# Patient Record
Sex: MALE | Race: WHITE | NOT HISPANIC OR LATINO | Employment: OTHER | URBAN - METROPOLITAN AREA
[De-identification: names, ages, dates, MRNs, and addresses within clinical notes are randomized per-mention and may not be internally consistent; named-entity substitution may affect disease eponyms.]

---

## 2017-02-23 ENCOUNTER — HOSPITAL ENCOUNTER (EMERGENCY)
Facility: HOSPITAL | Age: 82
Discharge: HOME | End: 2017-02-23
Attending: EMERGENCY MEDICINE | Admitting: EMERGENCY MEDICINE
Payer: MEDICARE

## 2017-02-23 VITALS
RESPIRATION RATE: 18 BRPM | OXYGEN SATURATION: 99 % | WEIGHT: 240 LBS | BODY MASS INDEX: 30.81 KG/M2 | SYSTOLIC BLOOD PRESSURE: 124 MMHG | HEART RATE: 65 BPM | TEMPERATURE: 97.6 F | DIASTOLIC BLOOD PRESSURE: 65 MMHG

## 2017-02-23 DIAGNOSIS — Z00.00 WELL ADULT EXAM: Primary | ICD-10-CM

## 2017-02-23 PROCEDURE — 99284 EMERGENCY DEPT VISIT MOD MDM: CPT

## 2017-02-23 PROCEDURE — 93005 ELECTROCARDIOGRAM TRACING: CPT | Performed by: EMERGENCY MEDICINE

## 2017-02-23 RX ORDER — LOPERAMIDE HYDROCHLORIDE 2 MG/1
2 CAPSULE ORAL AS NEEDED
COMMUNITY
End: 2017-12-11 | Stop reason: ALTCHOICE

## 2017-02-23 RX ORDER — FEBUXOSTAT 40 MG/1
40 TABLET, FILM COATED ORAL DAILY
COMMUNITY
End: 2017-11-18

## 2017-02-24 LAB
ATRIAL RATE: 300 BPM
QRS AXIS: -5 DEGREES
QRSD INTERVAL: 80 MS
QT INTERVAL: 400 MS
QTC INTERVAL: 412 MS
T WAVE AXIS: 31 DEGREES
VENTRICULAR RATE: 64 BPM

## 2017-10-05 ENCOUNTER — GENERIC CONVERSION - ENCOUNTER (OUTPATIENT)
Dept: OTHER | Facility: OTHER | Age: 82
End: 2017-10-05

## 2017-11-18 ENCOUNTER — APPOINTMENT (EMERGENCY)
Dept: RADIOLOGY | Facility: HOSPITAL | Age: 82
DRG: 682 | End: 2017-11-18
Payer: MEDICARE

## 2017-11-18 ENCOUNTER — HOSPITAL ENCOUNTER (INPATIENT)
Facility: HOSPITAL | Age: 82
LOS: 2 days | Discharge: HOME | DRG: 682 | End: 2017-11-21
Attending: EMERGENCY MEDICINE | Admitting: HOSPITALIST
Payer: MEDICARE

## 2017-11-18 ENCOUNTER — APPOINTMENT (EMERGENCY)
Dept: CT IMAGING | Facility: HOSPITAL | Age: 82
DRG: 682 | End: 2017-11-18
Payer: MEDICARE

## 2017-11-18 DIAGNOSIS — R41.0 ACUTE DELIRIUM: ICD-10-CM

## 2017-11-18 DIAGNOSIS — E86.9 VOLUME DEPLETION: ICD-10-CM

## 2017-11-18 DIAGNOSIS — N17.9 AKI (ACUTE KIDNEY INJURY) (HCC): Primary | ICD-10-CM

## 2017-11-18 DIAGNOSIS — E87.0 HYPERNATREMIA: ICD-10-CM

## 2017-11-18 PROBLEM — M10.9 GOUT: Status: ACTIVE | Noted: 2017-11-18

## 2017-11-18 LAB
ALBUMIN SERPL BCP-MCNC: 4 G/DL (ref 3.5–5)
ALP SERPL-CCNC: 67 U/L (ref 46–116)
ALT SERPL W P-5'-P-CCNC: 28 U/L (ref 12–78)
ANION GAP SERPL CALCULATED.3IONS-SCNC: 9 MMOL/L (ref 4–13)
APTT PPP: 30 SECONDS (ref 23–35)
AST SERPL W P-5'-P-CCNC: 21 U/L (ref 5–45)
BASOPHILS # BLD AUTO: 0.04 THOUSANDS/ΜL (ref 0–0.1)
BASOPHILS NFR BLD AUTO: 1 % (ref 0–1)
BILIRUB SERPL-MCNC: 0.75 MG/DL (ref 0.2–1)
BUN SERPL-MCNC: 61 MG/DL (ref 5–25)
CALCIUM SERPL-MCNC: 9.1 MG/DL (ref 8.3–10.1)
CHLORIDE SERPL-SCNC: 108 MMOL/L (ref 100–108)
CO2 SERPL-SCNC: 32 MMOL/L (ref 21–32)
CREAT SERPL-MCNC: 2.93 MG/DL (ref 0.6–1.3)
EOSINOPHIL # BLD AUTO: 0.11 THOUSAND/ΜL (ref 0–0.61)
EOSINOPHIL NFR BLD AUTO: 1 % (ref 0–6)
ERYTHROCYTE [DISTWIDTH] IN BLOOD BY AUTOMATED COUNT: 13.4 % (ref 11.6–15.1)
GFR SERPL CREATININE-BSD FRML MDRD: 17 ML/MIN/1.73SQ M
GLUCOSE SERPL-MCNC: 103 MG/DL (ref 65–140)
HCT VFR BLD AUTO: 35.1 % (ref 36.5–49.3)
HGB BLD-MCNC: 11.8 G/DL (ref 12–17)
INR PPP: 0.99 (ref 0.86–1.16)
LYMPHOCYTES # BLD AUTO: 1.88 THOUSANDS/ΜL (ref 0.6–4.47)
LYMPHOCYTES NFR BLD AUTO: 23 % (ref 14–44)
MCH RBC QN AUTO: 31.7 PG (ref 26.8–34.3)
MCHC RBC AUTO-ENTMCNC: 33.6 G/DL (ref 31.4–37.4)
MCV RBC AUTO: 94 FL (ref 82–98)
MONOCYTES # BLD AUTO: 0.63 THOUSAND/ΜL (ref 0.17–1.22)
MONOCYTES NFR BLD AUTO: 8 % (ref 4–12)
NEUTROPHILS # BLD AUTO: 5.47 THOUSANDS/ΜL (ref 1.85–7.62)
NEUTS SEG NFR BLD AUTO: 67 % (ref 43–75)
NRBC BLD AUTO-RTO: 0 /100 WBCS
PLATELET # BLD AUTO: 253 THOUSANDS/UL (ref 149–390)
PMV BLD AUTO: 10.8 FL (ref 8.9–12.7)
POTASSIUM SERPL-SCNC: 4.1 MMOL/L (ref 3.5–5.3)
PROT SERPL-MCNC: 7.4 G/DL (ref 6.4–8.2)
PROTHROMBIN TIME: 13.1 SECONDS (ref 12.1–14.4)
RBC # BLD AUTO: 3.72 MILLION/UL (ref 3.88–5.62)
SODIUM SERPL-SCNC: 149 MMOL/L (ref 136–145)
SPECIMEN SOURCE: NORMAL
TROPONIN I BLD-MCNC: 0.01 NG/ML (ref 0–0.08)
WBC # BLD AUTO: 8.13 THOUSAND/UL (ref 4.31–10.16)

## 2017-11-18 PROCEDURE — 80053 COMPREHEN METABOLIC PANEL: CPT | Performed by: EMERGENCY MEDICINE

## 2017-11-18 PROCEDURE — 36415 COLL VENOUS BLD VENIPUNCTURE: CPT | Performed by: EMERGENCY MEDICINE

## 2017-11-18 PROCEDURE — 84484 ASSAY OF TROPONIN QUANT: CPT

## 2017-11-18 PROCEDURE — 96360 HYDRATION IV INFUSION INIT: CPT

## 2017-11-18 PROCEDURE — 85730 THROMBOPLASTIN TIME PARTIAL: CPT | Performed by: EMERGENCY MEDICINE

## 2017-11-18 PROCEDURE — 99285 EMERGENCY DEPT VISIT HI MDM: CPT

## 2017-11-18 PROCEDURE — 85610 PROTHROMBIN TIME: CPT | Performed by: EMERGENCY MEDICINE

## 2017-11-18 PROCEDURE — 71010 HB CHEST X-RAY 1 VIEW FRONTAL (PORTABLE): CPT

## 2017-11-18 PROCEDURE — 93005 ELECTROCARDIOGRAM TRACING: CPT | Performed by: EMERGENCY MEDICINE

## 2017-11-18 PROCEDURE — 70450 CT HEAD/BRAIN W/O DYE: CPT

## 2017-11-18 PROCEDURE — 85025 COMPLETE CBC W/AUTO DIFF WBC: CPT | Performed by: EMERGENCY MEDICINE

## 2017-11-18 RX ORDER — LOPERAMIDE HYDROCHLORIDE 2 MG/1
2 CAPSULE ORAL AS NEEDED
Status: DISCONTINUED | OUTPATIENT
Start: 2017-11-18 | End: 2017-11-21 | Stop reason: HOSPADM

## 2017-11-18 RX ORDER — HYDRALAZINE HYDROCHLORIDE 20 MG/ML
10 INJECTION INTRAMUSCULAR; INTRAVENOUS EVERY 6 HOURS PRN
Status: DISCONTINUED | OUTPATIENT
Start: 2017-11-18 | End: 2017-11-21 | Stop reason: HOSPADM

## 2017-11-18 RX ORDER — FEBUXOSTAT 40 MG/1
TABLET, FILM COATED ORAL
COMMUNITY
Start: 2017-02-16

## 2017-11-18 RX ORDER — SIMVASTATIN 20 MG
TABLET ORAL
COMMUNITY
Start: 2013-01-21 | End: 2017-11-21 | Stop reason: HOSPADM

## 2017-11-18 RX ORDER — DONEPEZIL HYDROCHLORIDE 5 MG/1
10 TABLET, FILM COATED ORAL
Status: DISCONTINUED | OUTPATIENT
Start: 2017-11-18 | End: 2017-11-21 | Stop reason: HOSPADM

## 2017-11-18 RX ORDER — FEBUXOSTAT 40 MG/1
40 TABLET, FILM COATED ORAL DAILY
Status: DISCONTINUED | OUTPATIENT
Start: 2017-11-19 | End: 2017-11-21 | Stop reason: CLARIF

## 2017-11-18 RX ORDER — PRAVASTATIN SODIUM 40 MG
40 TABLET ORAL
Status: DISCONTINUED | OUTPATIENT
Start: 2017-11-19 | End: 2017-11-21 | Stop reason: HOSPADM

## 2017-11-18 RX ORDER — SODIUM CHLORIDE, SODIUM LACTATE, POTASSIUM CHLORIDE, CALCIUM CHLORIDE 600; 310; 30; 20 MG/100ML; MG/100ML; MG/100ML; MG/100ML
100 INJECTION, SOLUTION INTRAVENOUS CONTINUOUS
Status: DISCONTINUED | OUTPATIENT
Start: 2017-11-18 | End: 2017-11-19

## 2017-11-18 RX ORDER — CALCITRIOL 0.25 UG/1
0.25 CAPSULE, LIQUID FILLED ORAL EVERY OTHER DAY
Status: DISCONTINUED | OUTPATIENT
Start: 2017-11-19 | End: 2017-11-21 | Stop reason: HOSPADM

## 2017-11-18 RX ADMIN — DONEPEZIL HYDROCHLORIDE 10 MG: 5 TABLET, FILM COATED ORAL at 22:27

## 2017-11-18 RX ADMIN — SODIUM CHLORIDE 1000 ML: 0.9 INJECTION, SOLUTION INTRAVENOUS at 19:19

## 2017-11-18 RX ADMIN — SODIUM CHLORIDE, SODIUM LACTATE, POTASSIUM CHLORIDE, AND CALCIUM CHLORIDE 100 ML/HR: .6; .31; .03; .02 INJECTION, SOLUTION INTRAVENOUS at 22:27

## 2017-11-18 NOTE — ED PROVIDER NOTES
History  Chief Complaint   Patient presents with    Failure To Thrive     Per [place] patient last seen acting normal this am  Staff states that patient was not responding to them  Staff reports patient has  been refusing meds and food lately and has been noted with failure to thrive  Per EMS, sternal rub given and patient opened eyes and rolled his at them when they stated he was faking  81 yo male sent from [facility] dementia unit of [facility] [place] with concern that he was "unresponsive" which was described as not opening eyes or responding verbally to sternal rub  There was not report of apnea or pulselessness, and when paramedics arrived, he opened his eyes for them  NH staff then said he hasn't been eating and drinking well  On arrival in the ED, he has normal vital signs, moves all extremities, and will only answer yes/no if persistent questions are asked, although he did follow commands for me  History provided by:  Nursing home, EMS personnel and relative  History limited by:  Dementia      Prior to Admission Medications   Prescriptions Last Dose Informant Patient Reported? Taking? LEVOTHYROXINE SODIUM PO   Yes Yes   Sig: Take 0 125 mg by mouth daily  Memantine HCl-Donepezil HCl (NAMZARIC) 28-10 MG CP24   Yes Yes   Sig: Take 1 capsule by mouth   calcitriol (ROCALTROL) 0 25 mcg capsule   Yes Yes   Sig: Take 0 25 mcg by mouth every other day 3 times per week ( M/W/F)    febuxostat (ULORIC) 40 mg tablet   Yes Yes   Sig: Take by mouth   furosemide (LASIX) 20 mg tablet   Yes Yes   Sig: Take 20 mg by mouth daily  loperamide (IMODIUM) 2 mg capsule   Yes No   Sig: Take 2 mg by mouth as needed for diarrhea   losartan-hydrochlorothiazide (HYZAAR) 50-12 5 mg per tablet   Yes Yes   Sig: Take 1 tablet by mouth daily  simvastatin (ZOCOR) 20 mg tablet   Yes No   Sig: Take 20 mg by mouth daily at bedtime     simvastatin (ZOCOR) 20 mg tablet   Yes Yes   Sig: Take by mouth Facility-Administered Medications: None       Past Medical History:   Diagnosis Date    Ambulatory dysfunction     Arthritis     CKD (chronic kidney disease) stage 4, GFR 15-29 ml/min ([facility])     CVA (cerebral vascular accident) ([place] [address] )     Dementia     Disease of thyroid gland     Elevated blood uric acid level     GERD (gastroesophageal reflux disease)     GI bleed     HTN (hypertension), benign     Hyperlipidemia     Hyperlipidemia     Hypertension     Hypothyroid     Normocytic anemia     [facility] disease) ([facility])     Pulmonary embolism (HCC)     Renal disorder     Secondary hyperparathyroidism ([place] [address] )     Stroke ([place] [address] )     Vitamin D deficiency        Past Surgical History:   Procedure Laterality Date    NO PAST SURGERIES         Family History   Problem Relation Age of Onset    Diabetes Mother     Cancer Father      I have reviewed and agree with the history as documented  Social History   Substance Use Topics    Smoking status: Never Smoker    Smokeless tobacco: Not on file    Alcohol use Yes      Comment: socially         Review of Systems   Unable to perform ROS: Dementia       Physical Exam  ED Triage Vitals   Temperature Pulse Respirations Blood Pressure SpO2   11/18/17 1839 11/18/17 1843 11/18/17 1843 11/18/17 1843 11/18/17 1843   (!) 97 4 °F (36 3 °C) (!) 53 16 160/73 94 %      Temp Source Heart Rate Source Patient Position - Orthostatic VS BP Location FiO2 (%)   11/18/17 1839 11/18/17 1843 11/18/17 1843 11/18/17 1843 --   Oral Monitor Sitting Left arm       Pain Score       --                  Orthostatic Vital Signs  Vitals:    11/18/17 1843   BP: 160/73   Pulse: (!) 53   Patient Position - Orthostatic VS: Sitting       Physical Exam   Constitutional: He appears well-developed and well-nourished  HENT:   Head: Normocephalic and atraumatic     Right Ear: External ear normal    Left Ear: External ear normal    Nose: Nose normal    Mouth/Throat: Oropharynx is clear and moist  Mucous membranes are dry  Eyes: Conjunctivae and EOM are normal  Pupils are equal, round, and reactive to light  Neck: Normal range of motion  Neck supple  Cardiovascular: Normal heart sounds  Bradycardia present  Exam reveals no decreased pulses  Pulmonary/Chest: Effort normal    Abdominal: There is no tenderness  Musculoskeletal: Normal range of motion  Neurological:   He prefers to keep his eyes closed and not talk to anyone, but is awake, and will follow commands that are simple if we are persistent  Skin: Skin is warm and dry  Nursing note and vitals reviewed  ED Medications  Medications   sodium chloride 0 9 % bolus 1,000 mL (1,000 mL Intravenous New Bag 11/18/17 1919)       Diagnostic Studies  Results Reviewed     Procedure Component Value Units Date/Time    Comprehensive metabolic panel [[id number]]  (Abnormal) Collected:  11/18/17 1847    Lab Status:  Final result Specimen:  Blood from Arm, Right Updated:  11/18/17 1908     Sodium 149 (H) mmol/L      Potassium 4 1 mmol/L      Chloride 108 mmol/L      CO2 32 mmol/L      Anion Gap 9 mmol/L      BUN 61 (H) mg/dL      Creatinine 2 93 (H) mg/dL      Glucose 103 mg/dL      Calcium 9 1 mg/dL      AST 21 U/L      ALT 28 U/L      Alkaline Phosphatase 67 U/L      Total Protein 7 4 g/dL      Albumin 4 0 g/dL      Total Bilirubin 0 75 mg/dL      eGFR 17 ml/min/1 73sq m     Narrative:         National Kidney Disease Education Program recommendations are as follows:  GFR calculation is accurate only with a steady state creatinine  Chronic Kidney disease less than 60 ml/min/1 73 sq  meters  Kidney failure less than 15 ml/min/1 73 sq  meters      Protime-INR [[id number]]  (Normal) Collected:  11/18/17 1847    Lab Status:  Final result Specimen:  Blood from Arm, Right Updated:  11/18/17 1904     Protime 13 1 seconds      INR 0 99    APTT [[id number]]  (Normal) Collected:  11/18/17 1847    Lab Status:  Final result Specimen:  Blood from Arm, Right Updated:  11/18/17 1904     PTT 30 seconds     Narrative: Therapeutic Heparin Range = 60-90 seconds    POCT troponin [[id number]]  (Normal) Collected:  11/18/17 1843    Lab Status:  Final result Updated:  11/18/17 1856     POC Troponin I 0 01 ng/ml      Specimen Type VENOUS    Narrative:         [employer] i-Stat handheld analyzer 99% cutoff is > 0 08ng/mL in [facility] Emergency Departments    o cTnI 99% cutoff is useful only when applied to patients in the clinical setting of myocardial ischemia  o cTnI 99% cutoff should be interpreted in the context of clinical history, ECG findings and possibly cardiac imaging to establish correct diagnosis  o cTnI 99% cutoff may be suggestive but clearly not indicative of a coronary event without the clinical setting of myocardial ischemia  CBC and differential [[id number]]  (Abnormal) Collected:  11/18/17 1847    Lab Status:  Final result Specimen:  Blood from Arm, Right Updated:  11/18/17 1856     WBC 8 13 Thousand/uL      RBC 3 72 (L) Million/uL      Hemoglobin 11 8 (L) g/dL      Hematocrit 35 1 (L) %      MCV 94 fL      MCH 31 7 pg      MCHC 33 6 g/dL      RDW 13 4 %      MPV 10 8 fL      Platelets 087 Thousands/uL      nRBC 0 /100 WBCs      Neutrophils Relative 67 %      Lymphocytes Relative 23 %      Monocytes Relative 8 %      Eosinophils Relative 1 %      Basophils Relative 1 %      Neutrophils Absolute 5 47 Thousands/µL      Lymphocytes Absolute 1 88 Thousands/µL      Monocytes Absolute 0 63 Thousand/µL      Eosinophils Absolute 0 11 Thousand/µL      Basophils Absolute 0 04 Thousands/µL     POCT urinalysis dipstick [[id number]]     Lab Status:  No result Specimen:  Urine                  XR chest 1 view portable   ED Interpretation by Sage Faulkner MD (11/18 1938)   No acute findings      CT head without contrast   Final Result by Marlene Blackman MD (11/18 1923)      No acute intracranial abnormality  Microangiopathic changes           Workstation performed: [id number]                    Procedures  ECG 12 Lead Documentation  Date/Time: 11/18/2017 6:40 PM  Performed by: Ron Sorto by: Edith Carmona     Rate:     ECG rate:  51  Rhythm:     Rhythm: sinus rhythm and A-V block    Conduction:     Conduction: abnormal      Abnormal conduction: 1st degree    ST segments:     ST segments:  Non-specific  CriticalCare Time  Performed by: Edith Carmona  Authorized by: Maria M Chen, [facility], [facility] provider statement:     Critical care time (minutes):  30    Critical care time was exclusive of:  Separately billable procedures and treating other patients and teaching time    Critical care was necessary to treat or prevent imminent or life-threatening deterioration of the following conditions:  Metabolic crisis    Critical care was time spent personally by me on the following activities:  Blood draw for specimens, obtaining history from patient or surrogate, development of treatment plan with patient or surrogate, discussions with consultants, evaluation of patient's response to treatment, examination of patient, interpretation of cardiac output measurements, ordering and performing treatments and interventions, ordering and review of laboratory studies, ordering and review of radiographic studies, re-evaluation of patient's condition and review of old charts    I assumed direction of critical care for this patient from another provider in my specialty: no             Phone Contacts  ED Phone Contact    ED Course  ED Course as of Nov 18 1953   Sat Nov 18, 2017   1854 D/W his daughter Sri Peralta at bedside, who says his baseline is actually more on the agitated side, ambulatory, at times belligerent and has been known to escape, and for that matter, he gets agitated and thinks he is leaving when she visits him on the unit  She says that he is definitely much less active than his baseline    She confirmed he is "definitely no code", but IV fluids, antibiotics and other interventions are acceptable  They have no intention of pursuing aggressive medical treatment for medical conditions that represent worsening of baseline conditions or likely to cause imminent death  She is already prepared to allow him to go back to the NH after ED testing if there is not an immediately reversible condition from which he would benefit from hospitalization  1936 Hypernatremia c/w volume depletion Sodium: (!) 149   1937 Baseline kidney disease, CRT elevated from previous baseline, also c/w acute volume depletion  I d/w his daughter and the decision was made for admission for rehydration, correction of hypernatremia and CRT until it is clear he is either at a new baseline or improving     Creatinine: (!) 2 93   1944 BUN: (!) 61                               MDM  CritCare Time    Disposition  Final diagnoses:   KULWINDER (acute kidney injury) ([facility] [address] )   Hypernatremia   Volume depletion   Acute delirium     Time reflects when diagnosis was documented in both MDM as applicable and the Disposition within this note     Time User Action Codes Description Comment    11/18/2017  7:49 PM Ledy Paganini L Add [N17 9] Acute kidney injury ([facility] [address] )     11/18/2017  7:49 PM Ortiz Joshua [N17 9] Acute kidney injury ([facility] [address] )     11/18/2017  7:49 PM Ledy Paganini L Add [N17 9] KULWINDER (acute kidney injury) ([facility] [address] )     11/18/2017  7:49 PM Ledy Paganini L Add [E87 0] Hypernatremia     11/18/2017  7:49 PM Robyn Spinner Add [E86 9] Volume depletion     11/18/2017  7:49 PM Ledy Paganini L Add [R41 0] Acute delirium       ED Disposition     ED Disposition Condition Comment    Admit  Case was discussed with [place], for SLIM and the patient's admission status was agreed to be Admission Status: observation status to the service of Dr Brittany Santiago          Follow-up Information    None       Patient's Medications   Discharge Prescriptions    No medications on file     No discharge procedures on file     ED Provider  Electronically Signed by           Jose Franklin MD  11/18/17 5174

## 2017-11-18 NOTE — ED NOTES
Daughter reports adjustment in med recently and not sure if this is cause of patients behavior     Jade Zambrano RN  11/18/17 5822

## 2017-11-19 LAB
ANION GAP SERPL CALCULATED.3IONS-SCNC: 9 MMOL/L (ref 4–13)
BACTERIA UR QL AUTO: ABNORMAL /HPF
BILIRUB UR QL STRIP: NEGATIVE
BUN SERPL-MCNC: 58 MG/DL (ref 5–25)
CALCIUM SERPL-MCNC: 8.6 MG/DL (ref 8.3–10.1)
CHLORIDE SERPL-SCNC: 111 MMOL/L (ref 100–108)
CLARITY UR: CLEAR
CO2 SERPL-SCNC: 28 MMOL/L (ref 21–32)
COLOR UR: YELLOW
CREAT SERPL-MCNC: 2.45 MG/DL (ref 0.6–1.3)
GFR SERPL CREATININE-BSD FRML MDRD: 22 ML/MIN/1.73SQ M
GLUCOSE P FAST SERPL-MCNC: 93 MG/DL (ref 65–99)
GLUCOSE SERPL-MCNC: 93 MG/DL (ref 65–140)
GLUCOSE UR STRIP-MCNC: NEGATIVE MG/DL
HGB UR QL STRIP.AUTO: ABNORMAL
KETONES UR STRIP-MCNC: NEGATIVE MG/DL
LEUKOCYTE ESTERASE UR QL STRIP: NEGATIVE
NITRITE UR QL STRIP: NEGATIVE
NON-SQ EPI CELLS URNS QL MICRO: ABNORMAL /HPF
PH UR STRIP.AUTO: 6 [PH] (ref 4.5–8)
POTASSIUM SERPL-SCNC: 3.6 MMOL/L (ref 3.5–5.3)
PROT UR STRIP-MCNC: NEGATIVE MG/DL
RBC #/AREA URNS AUTO: ABNORMAL /HPF
SODIUM SERPL-SCNC: 148 MMOL/L (ref 136–145)
SP GR UR STRIP.AUTO: 1.02 (ref 1–1.03)
UROBILINOGEN UR QL STRIP.AUTO: >=8 E.U./DL
WBC #/AREA URNS AUTO: ABNORMAL /HPF

## 2017-11-19 PROCEDURE — 81001 URINALYSIS AUTO W/SCOPE: CPT | Performed by: HOSPITALIST

## 2017-11-19 PROCEDURE — 80048 BASIC METABOLIC PNL TOTAL CA: CPT | Performed by: PHYSICIAN ASSISTANT

## 2017-11-19 RX ORDER — NYSTATIN 100000 [USP'U]/G
POWDER TOPICAL 2 TIMES DAILY
Status: DISCONTINUED | OUTPATIENT
Start: 2017-11-19 | End: 2017-11-21 | Stop reason: HOSPADM

## 2017-11-19 RX ORDER — SODIUM CHLORIDE 450 MG/100ML
50 INJECTION, SOLUTION INTRAVENOUS CONTINUOUS
Status: DISCONTINUED | OUTPATIENT
Start: 2017-11-19 | End: 2017-11-21 | Stop reason: HOSPADM

## 2017-11-19 RX ADMIN — LEVOTHYROXINE SODIUM 125 MCG: 100 TABLET ORAL at 09:12

## 2017-11-19 RX ADMIN — SODIUM CHLORIDE 50 ML/HR: 0.45 INJECTION, SOLUTION INTRAVENOUS at 15:29

## 2017-11-19 RX ADMIN — NYSTATIN: 100000 POWDER TOPICAL at 17:45

## 2017-11-19 RX ADMIN — PRAVASTATIN SODIUM 40 MG: 40 TABLET ORAL at 17:00

## 2017-11-19 RX ADMIN — CALCITRIOL 0.25 MCG: 0.25 CAPSULE ORAL at 09:12

## 2017-11-19 RX ADMIN — DONEPEZIL HYDROCHLORIDE 10 MG: 5 TABLET, FILM COATED ORAL at 21:29

## 2017-11-19 NOTE — PHYSICIAN ADVISOR
Current patient class: Inpatient  The patient is currently on Hospital Day: 2      The patient was admitted to the hospital at [phone] on 11/19/17 for the following diagnosis:  Acute delirium [R41 0]  Hypernatremia [E87 0]  Volume depletion [E86 9]  Unresponsive [R41 89]  KULWINDER (acute kidney injury) ([facility] [address] ) [N17 9]       There is documentation in the medical record of an expected length of stay of at least 2 midnights  The patient is therefore expected to satisfy the 2 midnight benchmark and given the 2 midnight presumption is appropriate for INPATIENT ADMISSION  Given this expectation of a satisfying stay, CMS instructs us that the patient is most often appropriate for inpatient admission under part A provided medical necessity is documented in the chart  After review of the relevant documentation, labs, vital signs and test results, the patient is appropriate for INPATIENT ADMISSION  Admission to the hospital as an inpatient is a complex decision making process which requires the practitioner to consider the patients presenting complaint, history and physical examination and all relevant testing  With this in mind, in this case, the patient was deemed appropriate for INPATIENT ADMISSION  After review of the documentation and testing available at the time of the admission I concur with this clinical determination of medical necessity  Rationale is as follows: The patient is a 80 yrs old Male who presented to the ED at 11/18/2017  6:34 PM with a chief complaint of Failure To Thrive (Per [place] patient last seen acting normal this am  Staff states that patient was not responding to them  Staff reports patient has  been refusing meds and food lately and has been noted with failure to thrive    Per EMS, sternal rub given and patient opened eyes and rolled his at them when they stated he was faking  )    The patients vitals on arrival were ED Triage Vitals   Temperature Pulse Respirations Blood Pressure SpO2   11/18/17 1839 11/18/17 1843 11/18/17 1843 11/18/17 1843 11/18/17 1843   (!) 97 4 °F (36 3 °C) (!) 53 16 160/73 94 %      Temp Source Heart Rate Source Patient Position - Orthostatic VS BP Location FiO2 (%)   11/18/17 1839 11/18/17 1843 11/18/17 1843 11/18/17 1843 --   Oral Monitor Sitting Left arm       Pain Score       11/18/17 2204       No Pain           Past Medical History:   Diagnosis Date    KULWINDER [facility] kidney injury) ([facility] [address] ) 11/18/2017    Ambulatory dysfunction     Arthritis     CKD (chronic kidney disease) stage 4, GFR 15-29 ml/min ([facility])     CVA (cerebral vascular accident) ([facility] [address] )     Dementia     Disease of thyroid gland     Elevated blood uric acid level     GERD (gastroesophageal reflux disease)     GI bleed     Gout 11/18/2017    HTN (hypertension), benign     Hyperlipidemia     Hyperlipidemia     Hypertension     Hypothyroid     Normocytic anemia     PAD (peripheral artery disease) (HCC)     Pulmonary embolism (HCC)     Renal disorder     Secondary hyperparathyroidism ([facility] [address] )     [facility]     Vitamin D deficiency      Past Surgical History:   Procedure Laterality Date    NO PAST SURGERIES             Consults have been placed to:   None    Vitals:    11/18/17 2204 11/18/17 2300 11/19/17 0813 11/19/17 1537   BP: 154/66 153/67 128/62 123/61   Pulse:  64 58 (!) 53   Resp:  16 18 18   Temp:  98 7 °F (37 1 °C) 100 1 °F (37 8 °C) 98 °F (36 7 °C)   TempSrc:  Oral Temporal Temporal   SpO2:  99% 97% 98%   Weight:  98 4 kg (216 lb 14 9 oz)     Height:  5' 10" (1 778 m)         Most recent labs:    Recent Labs      11/18/17   1847  11/19/17   0456   WBC  8 13   --    HGB  11 8*   --    HCT  35 1*   --    PLT  253   --    K  4 1  3 6   NA  149*  148*   CALCIUM  9 1  8 6   BUN  61*  58*   CREATININE  2 93*  2 45*   INR  0 99   --    AST  21   --    ALT  28   --    ALKPHOS  67   --    BILITOT  0 75   --        Scheduled Meds:  calcitriol 0 25 mcg Oral Every Other Day   donepezil 10 mg Oral HS   febuxostat 40 mg Oral Daily   levothyroxine 125 mcg Oral Daily   nystatin  Topical BID   pravastatin 40 mg Oral Daily With Dinner     Continuous Infusions:  sodium chloride 50 mL/hr Last Rate: 50 mL/hr (11/19/17 1529)     PRN Meds: hydrALAZINE    loperamide    Surgical procedures (if appropriate):

## 2017-11-19 NOTE — PROGRESS NOTES
To Obtain urine sample ordered straight cath had to be performed  Dasha Moon RN was cath keyla  Obtained clear yellow urine with no issues

## 2017-11-19 NOTE — H&P
History and Physical - [facility]    Patient Information: Jonah Munguia 80 y o  male MRN: [id number]  Unit/Bed#: ED 20 Encounter: [id number]  Admitting Physician: Tiff Wilson PA-C  PCP: Yahir Ingram MD  Date of Admission:  11/18/17      Assessment:    KULWINDER (acute kidney injury) ([place] [address] )    Plan:    KULWINDER (acute kidney injury) ([place] [address] )  · Underlying chronic kidney disease with creatinine of 2 93 consistent with significant elevation over previous  · Will hold hydrochlorothiazide and provide IV hydration and recheck BMP in the morning    Hypernatremia  · Sodium today 149  Will recheck in the morning following hydration    Hypertension  · Hold Lasix  · Switch to hydralazine IV to avoid Hyzaar    Gout  · Continue Uloric    Dementia  · Patient resides in a locked dementia unit at [facility]    Hyperlipidemia  · Statin      HPI:   Denisse Bentley is a 80 y o  male who resides in a locked dementia unit at [facility] who was reportedly unresponsive by the staff  His baseline is somewhat agitated and cantankerous  He was nonresponsive to sternal rub by the staff however he did respond to sternal rub by EMS  He was not pulseless or apneic at any point  This listless was different than his normal behavior  In the emergency department he was found to have an elevated creatinine with a hypernatremia  Denies: Chest pain, Shortness of Breath, Nausea, Vomiting, Diarrhea, Dysuria at present however patient is demented and thorough review of systems cannot be obtained    ROS:  Please see the HPI for the full details       PMH:  Principal Problem:    KULWINDER (acute kidney injury) ([place] [address] )  Active Problems:    CKD (chronic kidney disease) stage 4, GFR 15-29 ml/min ([facility])    Normocytic anemia    Hypothyroid    HTN (hypertension), benign    Dementia    Ambulatory dysfunction    Hyperlipidemia    Gout      Past Medical History:   Diagnosis Date    KULWINDER (acute kidney injury) ([place] [address] ) 11/18/2017    Ambulatory dysfunction     Arthritis     CKD (chronic kidney disease) stage 4, GFR 15-29 ml/min ([facility])     CVA [facility]cerebral vascular accident) [facility] )     Dementia     Disease of thyroid gland     Elevated blood uric acid level     GERD (gastroesophageal reflux disease)     GI bleed     Gout 11/18/2017    HTN (hypertension), benign     Hyperlipidemia     Hyperlipidemia     Hypertension     Hypothyroid     Normocytic anemia     PAD (peripheral artery disease) ([facility])     Pulmonary embolism (HCC)     Renal disorder     [facility]parathyroidism ([facility] )     Stroke ([facility] )     Vitamin D deficiency      Past Surgical History:   Procedure Laterality Date    NO PAST SURGERIES       Social History     Social History    Marital status:      Spouse name: N/A    Number of children: N/A    Years of education: N/A     Social History Main Topics    Smoking status: Never Smoker    Smokeless tobacco: Never Used    Alcohol use Yes      Comment: socially     Drug use: No    Sexual activity: Not Currently     Other Topics Concern    None     Social History Narrative    None     Family History   Problem Relation Age of Onset    Diabetes Mother     Cancer Father        MED/ALLERGIES:  No current facility-administered medications for this encounter  Current Outpatient Prescriptions   Medication Sig Dispense Refill    calcitriol (ROCALTROL) 0 25 mcg capsule Take 0 25 mcg by mouth every other day 3 times per week ( M/W/F)       febuxostat (ULORIC) 40 mg tablet Take by mouth      furosemide (LASIX) 20 mg tablet Take 20 mg by mouth daily   LEVOTHYROXINE SODIUM PO Take 0 125 mg by mouth daily   losartan-hydrochlorothiazide (HYZAAR) 50-12 5 mg per tablet Take 1 tablet by mouth daily        Memantine HCl-Donepezil HCl (NAMZARIC) 28-10 MG CP24 Take 1 capsule by mouth      simvastatin (ZOCOR) 20 mg tablet Take by mouth      loperamide (IMODIUM) 2 mg capsule Take 2 mg by mouth as needed for diarrhea      simvastatin (ZOCOR) 20 mg tablet Take 20 mg by mouth daily at bedtime  Allergies   Allergen Reactions    Allopurinol     Aspirin GI Bleeding       OBJECTIVE:    Current Vitals:   Blood Pressure: 155/70 (11/18/17 1945)  Pulse: (!) 52 (11/18/17 2000)  Temperature: (!) 97 4 °F (36 3 °C) (11/18/17 1839)  Temp Source: Oral (11/18/17 1839)  Respirations: 16 (11/18/17 2000)  Weight - Scale: 104 kg (229 lb 4 5 oz) (11/18/17 1840)  SpO2: 96 % (11/18/17 2000)    No intake or output data in the 24 hours ending 11/18/17 2039    Invasive Devices     Peripheral Intravenous Line            Peripheral IV 11/18/17 Right Antecubital less than 1 day                  Physical Exam   Constitutional: He appears well-developed and well-nourished  No distress  HENT:   Head: Normocephalic and atraumatic  Right Ear: External ear normal    Left Ear: External ear normal    Eyes: EOM are normal  Pupils are equal, round, and reactive to light  Right eye exhibits no discharge  Left eye exhibits no discharge  No scleral icterus  Neck: No thyromegaly present  Cardiovascular: Normal rate and normal heart sounds  An irregular rhythm present  No murmur heard  Pulses:       Radial pulses are 2+ on the right side, and 2+ on the left side  Pulmonary/Chest: Effort normal and breath sounds normal    Abdominal: Soft  Bowel sounds are normal  He exhibits no distension  There is no tenderness  Musculoskeletal: He exhibits no edema or tenderness  Lymphadenopathy:     He has no cervical adenopathy  Neurological: He is disoriented  Skin: He is not diaphoretic                                                       Lab Results:   Results from last 7 days  Lab Units 11/18/17  1847   WBC Thousand/uL 8 13   HEMOGLOBIN g/dL 11 8*   HEMATOCRIT % 35 1*   PLATELETS Thousands/uL 253      Results from last 7 days  Lab Units 11/18/17 1847   SODIUM mmol/L 149*   POTASSIUM mmol/L 4 1   CHLORIDE mmol/L 108   CO2 mmol/L 32   BUN mg/dL 61* CREATININE mg/dL 2 93*   CALCIUM mg/dL 9 1   TOTAL PROTEIN g/dL 7 4   BILIRUBIN TOTAL mg/dL 0 75   ALK PHOS U/L 67   ALT U/L 28   AST U/L 21   GLUCOSE RANDOM mg/dL 103     Lab Results   Component Value Date    CKTOTAL 151 06/26/2016    TROPONINI <0 02 06/26/2016         Imaging:  CT BRAIN - WITHOUT CONTRAST     INDICATION:  Altered mental status     COMPARISON:  None      TECHNIQUE:  CT examination of the brain was performed  In addition to axial images, coronal reformatted images were created and submitted for interpretation        Radiation dose length product (DLP) for this visit:  1114 mGy-cm   This examination, like all CT scans performed in the [facility], was performed utilizing techniques to minimize radiation dose exposure, including the use of iterative   reconstruction and automated exposure control        IMAGE QUALITY:  Diagnostic      FINDINGS:     PARENCHYMA:  Decreased attenuation is noted in the supratentorial white matter demonstrating an appearance most consistent with mild microangiopathic change      No intracranial mass, mass effect or midline shift  No CT signs of acute infarction  There is no parenchymal hemorrhage           VENTRICLES AND EXTRA-AXIAL SPACES:  Enlargement of ventricles and extra-axial CSF spaces consistent with cerebral and cerebellar atrophy      VISUALIZED ORBITS AND PARANASAL SINUSES:  Unremarkable      CALVARIUM AND EXTRACRANIAL SOFT TISSUES:   Normal      IMPRESSION:     No acute intracranial abnormality  Microangiopathic changes  CXR: No Acute Pulmonary disease    EKG, Pathology, and Other Studies: EKG: No acute ischemic findings; Sinus w/ 1st degree A-V block    VTE Prophylaxis: Reason for no pharmacologic prophylaxis Obs status    Code Status: DNR/DNI    Counseling / Coordination of Care: Total floor / unit time spent today 20 minutes      Anticipated Length of Stay will be: LESS THAN 2 (TWO) Midnights     Milagros Ko PA-C    This note has been constructed using a voice recognition system

## 2017-11-19 NOTE — CASE MANAGEMENT
Initial Clinical Review    Admission: Date/Time/Statement:    OBSERVATION 11/18/17@ 1952    INPATIENT ADMISSION 11/19/17 @ 0819    Orders Placed This Encounter   Procedures    Place in Observation (expected length of stay for this patient is less than two midnights)     Standing Status:   Standing     Number of Occurrences:   1     Order Specific Question:   Admitting Physician     Answer:   Fransico Bateman [[id number]]     Order Specific Question:   Level of Care     Answer:   Med Surg [[id number]]     11/19/17 0819 Release Arlyne Schaumann, MD (auto-released) Anaya Escobar   11/19/17 0819 Complete Arlyne Schaumann, MD    Order Details     Frequency Duration Priority Order Class   Once 1  occurrence Routine Hospital Performed   Order Questions     Question Answer Comment   Admitting Physician Fransico Bateman    Level of Care Med Surg    Estimated length of stay More than 2 Midnights    Certification I certify that inpatient services are medically necessary for this patient for a duration of greater than two midnights  See H&P and MD Progress Notes for additional information about the patient's course of treatment  ED: Date/Time/Mode of Arrival:   ED Arrival Information     Expected Arrival Acuity Means of Arrival Escorted By Service Admission Type    - 11/18/2017 18:34 Urgent Ambulance [address] [facility] Urgent    Arrival Complaint    Unresponsive      Chief Complaint:   Chief Complaint   Patient presents with    Failure To Thrive     Per [facility] patient last seen acting normal this am  Staff states that patient was not responding to them  Staff reports patient has  been refusing meds and food lately and has been noted with failure to thrive  Per EMS, sternal rub given and patient opened eyes and rolled his at them when they stated he was faking      History of Illness:   79 yo male sent from locked dementia unit of [facility] with concern that he was "unresponsive" which was described as not opening eyes or responding verbally to sternal rub  There was not report of apnea or pulselessness, and when paramedics arrived, he opened his eyes for them  NH staff then said he hasn't been eating and drinking well  On arrival in the ED, he has normal vital signs, moves all extremities, and will only answer yes/no if persistent questions are asked, although he did follow commands for me  ED Vital Signs:   ED Triage Vitals   Temperature Pulse Respirations Blood Pressure SpO2   11/18/17 1839 11/18/17 1843 11/18/17 1843 11/18/17 1843 11/18/17 1843   (!) 97 4 °F (36 3 °C) (!) 53 16 160/73 94 %      Temp Source Heart Rate Source Patient Position - Orthostatic VS BP Location FiO2 (%)   11/18/17 1839 11/18/17 1843 11/18/17 1843 11/18/17 1843 --   Oral Monitor Sitting Left arm       Pain Score       11/18/17 2204       No Pain        Wt Readings from Last 1 Encounters:   11/18/17 98 4 kg (216 lb 14 9 oz)   Vital Signs (abnormal): Abnormal Labs/Diagnostic Test Results:     BUN 58 CR 2 45 GFR 22  CT HEAD=No acute intracranial abnormality  Microangiopathic changes  ED Treatment:   Medication Administration from 11/18/2017 1834 to 11/18/2017 2154       Date/Time Order Dose Route Action Action by Comments     11/18/2017 2059 sodium chloride 0 9 % bolus 1,000 mL 0 mL Intravenous Stopped Byron Brannon RN      11/18/2017 1919 sodium chloride 0 9 % bolus 1,000 mL 1,000 mL Intravenous New Bag Byron Brannon RN       Past Medical/Surgical History:    Active Ambulatory Problems     Diagnosis Date Noted    Vitamin D deficiency     [facility]     [facility]     GERD (gastroesophageal reflux disease)     CKD (chronic kidney disease) stage 4, GFR 15-29 ml/min ([facility]     Elevated blood uric acid level     Normocytic anemia     Hypothyroid     HTN (hypertension), benign     Dementia     Ambulatory dysfunction     Hyperlipidemia     Cellulitis of left hand 06/26/2016     Resolved Ambulatory Problems     Diagnosis Date Noted    No Resolved Ambulatory Problems     Past Medical History:   Diagnosis Date    KULWINDER (acute kidney injury) ([place] [address] ) 11/18/2017    Ambulatory dysfunction     Arthritis     CKD (chronic kidney disease) stage 4, GFR 15-29 ml/min ([facility])     CVA (cerebral vascular accident) ([place] [address] )     Dementia     Disease of thyroid gland     Elevated blood uric acid level     GERD (gastroesophageal reflux disease)     GI bleed     Gout 11/18/2017    HTN (hypertension), benign     Hyperlipidemia     Hyperlipidemia     Hypertension     Hypothyroid     Normocytic anemia     [facility]AD [facility]peripheral [facility] disease) ([facility])     Pulmonary embolism (HCC)     Renal disorder     Secondary hyperparathyroidism ([place] [address] )     Stroke ([place] [address] )     Vitamin D deficiency    Admitting Diagnosis: Acute delirium [R41 0]  Hypernatremia [E87 0]  Volume depletion [E86 9]  Unresponsive [R41 89]  KULWINDER (acute kidney injury) ([place] [address] ) [N17 9]  Age/Sex: 80 y o  male  Assessment/Plan:   KULWINDER (acute kidney injury) ([place] [address] )  · Underlying chronic kidney disease with creatinine of 2 93 consistent with significant elevation over previous  · Will hold hydrochlorothiazide and provide IV hydration and recheck BMP in the morning  Hypernatremia  · Sodium today 149    Will recheck in the morning following hydration  Hypertension  · Hold Lasix  · Switch to hydralazine IV to avoid Hyzaar  Gout  · Continue Uloric  Dementia  · Patient resides in a locked dementia unit at [facility]  Hyperlipidemia  · Statin  Admission Orders:  MED SURG  BLE VENODYNES  Scheduled Meds:   calcitriol 0 25 mcg Oral Every Other Day   donepezil 10 mg Oral HS   febuxostat 40 mg Oral Daily   levothyroxine 125 mcg Oral Daily   pravastatin 40 mg Oral Daily With Dinner     Continuous Infusions:   lactated ringers 100 mL/hr Last Rate: 100 mL/hr (11/18/17 2227)     PRN Meds: hydrALAZINE    loperamide

## 2017-11-19 NOTE — PROGRESS NOTES
Progress Note Keron Patel 80 y o  male MRN: [id number]    Unit/Bed#: E4 -01 Encounter: [id number]      Assessment/Plan:  1-acute renal failure on chronic kidney disease 3  Likely secondary to poor oral intake  Patient's baseline creatinine is around 1 74  He was found to have a creatinine of 2 93--which has now trended down to 2 45  Will continue 1/2 normal saline till sodium trends back to normal     2-hypernatremia likely secondary to poor oral intake as well -continue half normal saline tillsodium normalizes  3-low-grade fever-likely viral   patient denies any complaints however will send a urinalysis to rule out a UTI  His x-ray chest is normal   The patient is not having any cough  Patient had a flu shot this year  Patient appears to be stable  Will hold off on  antibiotics for now  4-hypertension--blood pressure currently stable  Hyzaar has been held    5-gout-continue Uloric-spoke to Ashely Damon is not available here and the patient is allergic to allopurinol-will have them bring it from home so that we can continue it here  If they are unable to bring it -will restarted once patient gets back to [address] [place] [address] Road with psychosis-patient resides in a locked dementia unit at [facility]    Patient will need a more than 2 midnight stay hence is status is changed to inpatient     Subjective:  Patient is a resident a at the locked dementia unit at [facility]  And has managed to elope from there once  Patient admitted with acute encephalopathy and found to have an elevated creatinine and hypernatremia  Patient is confused at baseline and denies any complaints  Physical Exam:   Vitals: Blood pressure 128/62, pulse 58, temperature 100 1 °F (37 8 °C), temperature source Temporal, resp  rate 18, height 5' 10" (1 778 m), weight 98 4 kg (216 lb 14 9 oz), SpO2 97 %  ,Body mass index is 31 13 kg/m²          Gen:  Awake and verbal  Heart: regular rate and rhythm, S1S2 present, no murmur, rub or gallop  Lungs: clear to ausculatation bilaterally  No wheezing, crackless, or rhonchi  No accessory muscle use or respiratory distress  Abd: soft, non-tender, non-distended  NABS, no guarding, rebound or peritoneal signs  Extremities: no clubbing, cyanosis or edema  2+pedal pulses bilaterally  Full range of motion  Neuro: awake, alert and orientedx1  Skin: warm and dry: no petechiae, purpura and rash      LABS:     Results from last 7 days  Lab Units 11/18/17  1847   WBC Thousand/uL 8 13   HEMOGLOBIN g/dL 11 8*   HEMATOCRIT % 35 1*   PLATELETS Thousands/uL 253       Results from last 7 days  Lab Units 11/19/17  0456 11/18/17  1847   SODIUM mmol/L 148* 149*   POTASSIUM mmol/L 3 6 4 1   CHLORIDE mmol/L 111* 108   CO2 mmol/L 28 32   BUN mg/dL 58* 61*   CREATININE mg/dL 2 45* 2 93*   GLUCOSE RANDOM mg/dL 93 103   CALCIUM mg/dL 8 6 9 1       Intake/Output Summary (Last 24 hours) at 11/19/17 1446  Last data filed at 11/19/17 1301   Gross per 24 hour   Intake          2576 67 ml   Output              136 ml   Net          2440 67 ml           calcitriol 0 25 mcg Oral Every Other Day   donepezil 10 mg Oral HS   febuxostat 40 mg Oral Daily   levothyroxine 125 mcg Oral Daily   pravastatin 40 mg Oral Daily With Dinner       Family update- updated daughter Albertina Deras

## 2017-11-20 LAB
ANION GAP SERPL CALCULATED.3IONS-SCNC: 9 MMOL/L (ref 4–13)
BUN SERPL-MCNC: 47 MG/DL (ref 5–25)
CALCIUM SERPL-MCNC: 8.2 MG/DL (ref 8.3–10.1)
CHLORIDE SERPL-SCNC: 107 MMOL/L (ref 100–108)
CO2 SERPL-SCNC: 26 MMOL/L (ref 21–32)
CREAT SERPL-MCNC: 1.84 MG/DL (ref 0.6–1.3)
GFR SERPL CREATININE-BSD FRML MDRD: 31 ML/MIN/1.73SQ M
GLUCOSE SERPL-MCNC: 85 MG/DL (ref 65–140)
POTASSIUM SERPL-SCNC: 3.6 MMOL/L (ref 3.5–5.3)
SODIUM SERPL-SCNC: 142 MMOL/L (ref 136–145)

## 2017-11-20 PROCEDURE — 80048 BASIC METABOLIC PNL TOTAL CA: CPT | Performed by: HOSPITALIST

## 2017-11-20 RX ADMIN — SODIUM CHLORIDE 50 ML/HR: 0.45 INJECTION, SOLUTION INTRAVENOUS at 16:49

## 2017-11-20 RX ADMIN — PRAVASTATIN SODIUM 40 MG: 40 TABLET ORAL at 16:48

## 2017-11-20 RX ADMIN — NYSTATIN: 100000 POWDER TOPICAL at 16:49

## 2017-11-20 RX ADMIN — LEVOTHYROXINE SODIUM 125 MCG: 100 TABLET ORAL at 08:57

## 2017-11-20 RX ADMIN — DONEPEZIL HYDROCHLORIDE 10 MG: 5 TABLET, FILM COATED ORAL at 21:05

## 2017-11-20 RX ADMIN — NYSTATIN: 100000 POWDER TOPICAL at 08:57

## 2017-11-20 NOTE — PLAN OF CARE

## 2017-11-20 NOTE — PROGRESS NOTES
[facility] Internal Medicine Progress Note  Patient: Carlito Ramirez 80 y o  male   MRN: [id number]  PCP: Maynor Hansen MD  Unit/Bed#: E4 -10 Encounter: [id number]  Date Of Visit: 11/20/17      Assessment/plan  1-acute renal failure on chronic kidney disease 3  Likely secondary to poor oral intake  Patient's baseline creatinine is around 1 74  He was found to have a creatinine of 2 93--which has now trended down to 1 8  Will continue 1/2 normal saline till sodium trends back to normal      2-hypernatremia likely secondary to poor oral intake as well -has resolved    3-low-grade fever-likely viral   patient denies any complaints however will send a urinalysis to rule out a UTI  His x-ray chest is normal   The patient is not having any cough  Patient had a flu shot this year  Patient appears to be stable  Will hold off on  antibiotics for now      4-hypertension--blood pressure currently stable  Hyzaar has been held     5-gout-continue Uloric-spoke to daughter-since Uloric is not available here and the patient is allergic to allopurinol-will have them bring it from home so that we can continue it here  If they are unable to bring it -will restarted once patient gets back to [facility] with psychosis-patient resides in a locked dementia unit at [facility]- called daughter and left message  Anticipate d/c to [facility] tomorrow        Subjective:   Pt seen and examined  Pt doing well  No problems noted  Objective:     Vitals: Blood pressure 142/83, pulse 55, temperature 98 °F (36 7 °C), temperature source Temporal, resp  rate 18, height 5' 10" (1 778 m), weight 98 4 kg (216 lb 14 9 oz), SpO2 100 %  ,Body mass index is 31 13 kg/m²      Lab, Imaging and other studies:    Results from last 7 days  Lab Units 11/18/17  1847   WBC Thousand/uL 8 13   HEMOGLOBIN g/dL 11 8*   HEMATOCRIT % 35 1*   PLATELETS Thousands/uL 253   INR  0 99       Results from last 7 days  Lab Units 11/20/17  0608  11/18/17  1847   SODIUM mmol/L 142  < > 149*   POTASSIUM mmol/L 3 6  < > 4 1   CHLORIDE mmol/L 107  < > 108   CO2 mmol/L 26  < > 32   BUN mg/dL 47*  < > 61*   CREATININE mg/dL 1 84*  < > 2 93*   CALCIUM mg/dL 8 2*  < > 9 1   TOTAL PROTEIN g/dL  --   --  7 4   BILIRUBIN TOTAL mg/dL  --   --  0 75   ALK PHOS U/L  --   --  67   ALT U/L  --   --  28   AST U/L  --   --  21   GLUCOSE RANDOM mg/dL 85  < > 103   < > = values in this interval not displayed  Lab Results   Component Value Date    BLOODCX No Growth After 5 Days  06/26/2016    BLOODCX No Growth After 5 Days  06/26/2016       Physical exam:  Physical Exam   Constitutional: No distress  HENT:   Head: Normocephalic and atraumatic  Eyes: EOM are normal  Pupils are equal, round, and reactive to light  Neck: Normal range of motion  Neck supple  Cardiovascular: Normal rate, regular rhythm and normal heart sounds  Exam reveals no gallop and no friction rub  No murmur heard  Pulmonary/Chest: Effort normal and breath sounds normal  No respiratory distress  He has no wheezes  He has no rales  Abdominal: Soft  Bowel sounds are normal  He exhibits no distension  There is no tenderness  There is no rebound  Musculoskeletal: Normal range of motion  Neurological: He is alert  Oriented x1 person   Skin: Skin is warm and dry  Psychiatric: He has a normal mood and affect         VTE Pharmacologic Prophylaxis: Sequential compression device (Venodyne)   VTE Mechanical Prophylaxis: sequential compression device    Counseling / Coordination of Care  Total floor / unit time spent today 20 minutes     Current Length of Stay: 1 day(s)    Current Patient Status: Inpatient       Code Status: Level 3 - DNAR and DNI

## 2017-11-21 VITALS
HEIGHT: 70 IN | HEART RATE: 60 BPM | WEIGHT: 216.93 LBS | OXYGEN SATURATION: 94 % | RESPIRATION RATE: 18 BRPM | DIASTOLIC BLOOD PRESSURE: 62 MMHG | BODY MASS INDEX: 31.06 KG/M2 | TEMPERATURE: 98.7 F | SYSTOLIC BLOOD PRESSURE: 129 MMHG

## 2017-11-21 PROBLEM — N17.9 AKI (ACUTE KIDNEY INJURY) (HCC): Status: RESOLVED | Noted: 2017-11-18 | Resolved: 2017-11-21

## 2017-11-21 LAB
ANION GAP SERPL CALCULATED.3IONS-SCNC: 6 MMOL/L (ref 4–13)
ATRIAL RATE: 51 BPM
BUN SERPL-MCNC: 40 MG/DL (ref 5–25)
CALCIUM SERPL-MCNC: 8.1 MG/DL (ref 8.3–10.1)
CHLORIDE SERPL-SCNC: 107 MMOL/L (ref 100–108)
CO2 SERPL-SCNC: 28 MMOL/L (ref 21–32)
CREAT SERPL-MCNC: 1.71 MG/DL (ref 0.6–1.3)
GFR SERPL CREATININE-BSD FRML MDRD: 34 ML/MIN/1.73SQ M
GLUCOSE SERPL-MCNC: 91 MG/DL (ref 65–140)
P AXIS: 54 DEGREES
POTASSIUM SERPL-SCNC: 4 MMOL/L (ref 3.5–5.3)
PR INTERVAL: 280 MS
QRS AXIS: -20 DEGREES
QRSD INTERVAL: 102 MS
QT INTERVAL: 454 MS
QTC INTERVAL: 418 MS
SODIUM SERPL-SCNC: 141 MMOL/L (ref 136–145)
T WAVE AXIS: 58 DEGREES
VENTRICULAR RATE: 51 BPM

## 2017-11-21 PROCEDURE — 80048 BASIC METABOLIC PNL TOTAL CA: CPT | Performed by: INTERNAL MEDICINE

## 2017-11-21 RX ORDER — NYSTATIN 100000 [USP'U]/G
POWDER TOPICAL 2 TIMES DAILY
Qty: 15 G | Refills: 0
Start: 2017-11-21 | End: 2017-11-21

## 2017-11-21 RX ORDER — FUROSEMIDE 20 MG/1
20 TABLET ORAL DAILY PRN
Refills: 0
Start: 2017-11-21 | End: 2017-11-21

## 2017-11-21 RX ORDER — NYSTATIN 100000 [USP'U]/G
POWDER TOPICAL 2 TIMES DAILY
Qty: 15 G | Refills: 0 | Status: SHIPPED | OUTPATIENT
Start: 2017-11-21

## 2017-11-21 RX ORDER — FUROSEMIDE 20 MG/1
20 TABLET ORAL DAILY PRN
Qty: 30 TABLET | Refills: 0 | Status: SHIPPED | OUTPATIENT
Start: 2017-11-21

## 2017-11-21 RX ADMIN — LEVOTHYROXINE SODIUM 125 MCG: 100 TABLET ORAL at 09:13

## 2017-11-21 RX ADMIN — SODIUM CHLORIDE 50 ML/HR: 0.45 INJECTION, SOLUTION INTRAVENOUS at 08:44

## 2017-11-21 RX ADMIN — CALCITRIOL 0.25 MCG: 0.25 CAPSULE ORAL at 09:14

## 2017-11-21 RX ADMIN — NYSTATIN: 100000 POWDER TOPICAL at 09:14

## 2017-11-21 NOTE — NURSING NOTE
Pt's discharge paper work was faxed with new prescriptions as requested by Shavonne Garibay from [facility]  A copy of discharge instructions were sent along with Mayank Bucio from the transport team taking pt to [facility]

## 2017-11-21 NOTE — PROGRESS NOTES
[facility] Progress Note  Patient: Olamide Other 80 y o  male   MRN: [id number]  PCP: Giuliana Vidal MD  Unit/Bed#: E4 -82 Encounter: [id number]  Date Of Visit: 11/21/17      Assessment/plan  Principal problem  1-acute renal failure on chronic kidney disease 3   Likely secondary to poor oral intake  Now back to baseline of 1 7  Can d/c ivf       2-hypernatremia likely secondary to poor oral intake as well -has resolved       3-low-grade fever-likely viral  ua negative   His x-ray chest is normal   The patient is not having any cough   Patient had a flu shot this year  Hannah Ramirez appears to be stable   Will hold off on  antibiotics for now  Active problems   1-hypertension--blood pressure currently stable   Hyzaar has been held     2-gout-can restart uloric at [facility]       3-dementia with psychosis-patient resides in a locked dementia unit at [facility] 24 d/c to [facility] ([facility] today    Subjective:   Pt seen and examined  Pt is doing well  No problems reported  Objective:     Vitals: Blood pressure 129/62, pulse 60, temperature 98 7 °F (37 1 °C), temperature source Tympanic, resp  rate 18, height 5' 10" (1 778 m), weight 98 4 kg (216 lb 14 9 oz), SpO2 94 %  ,Body mass index is 31 13 kg/m²      Lab, Imaging and other studies:    Results from last 7 days  Lab Units 11/18/17  1847   WBC Thousand/uL 8 13   HEMOGLOBIN g/dL 11 8*   HEMATOCRIT % 35 1*   PLATELETS Thousands/uL 253   INR  0 99       Results from last 7 days  Lab Units 11/21/17  0443  11/18/17  1847   SODIUM mmol/L 141  < > 149*   POTASSIUM mmol/L 4 0  < > 4 1   CHLORIDE mmol/L 107  < > 108   CO2 mmol/L 28  < > 32   BUN mg/dL 40*  < > 61*   CREATININE mg/dL 1 71*  < > 2 93*   CALCIUM mg/dL 8 1*  < > 9 1   TOTAL PROTEIN g/dL  --   --  7 4   BILIRUBIN TOTAL mg/dL  --   --  0 75   ALK PHOS U/L  --   --  67   ALT U/L  --   --  28   AST U/L  --   --  21   GLUCOSE RANDOM mg/dL 91  < > 103   < > = values in this interval not displayed  Lab Results   Component Value Date    BLOODCX No Growth After 5 Days  06/26/2016    BLOODCX No Growth After 5 Days  06/26/2016     Physical exam:  Physical Exam  General appearance: alert, appears stated age and cooperative  Head: Normocephalic, without obvious abnormality, atraumatic  Eyes: conjunctivae/corneas clear  PERRL, EOM's intact  Fundi benign    Neck: no adenopathy, no carotid bruit, no JVD, supple, symmetrical, trachea midline and thyroid not enlarged, symmetric, no tenderness/mass/nodules  Lungs: clear to auscultation bilaterally  Heart: regular rate and rhythm, S1, S2 normal, no murmur, click, rub or gallop  Abdomen: soft, non-tender; bowel sounds normal; no masses,  no organomegaly  Extremities: extremities normal, atraumatic, no cyanosis or edema  Pulses: 2+ and symmetric  Skin: Skin color, texture, turgor normal  No rashes or lesions  Neurologic: awake alert oriented x1 person      VTE Pharmacologic Prophylaxis: Sequential compression device (Venodyne)   VTE Mechanical Prophylaxis: sequential compression device    Counseling / Coordination of Care  Total floor / unit time spent today 20 minutes    Current Length of Stay: 2 day(s)    Current Patient Status: Inpatient       Code Status: Level 3 - DNAR and DNI

## 2017-11-21 NOTE — DISCHARGE SUMMARY
Discharge Summary - [facility]    Patient Information: Walker Duncan 80 y o  male MRN: [id number]  Unit/Bed#: E4 -01 Encounter: [id number]    Discharging Physician / Practitioner: Luz Marina Webster DO  PCP: Joana Silva MD  Admission Date: 11/18/2017  Discharge Date: 11/21/17    Reason for Admission: kulwinder and hypernatremia    Discharge Diagnoses:     Principal Problem (Resolved):    KULWINDER (acute kidney injury) ([address] )  Active Problems:    CKD (chronic kidney disease) stage 4, GFR 15-29 ml/min ([facility])    Normocytic anemia    Hypothyroid    HTN (hypertension), benign    Dementia    Ambulatory dysfunction    Hyperlipidemia    Gout      Consultations During Hospital Stay:  · none    Procedures Performed:     · none    Incidental Findings:   · none    Test Results Pending at Discharge (will require follow up):   · none     Outpatient Tests Requested:  none    Hospital Course:     Walker Duncan is a 80 y o  male patient who originally presented to the hospital on 11/18/2017 due to acute renal failure and hypernatremia which was due to poor po intake and diuretics  He has dementia  This is likely caused him not to eat and caused him to have an acute encephalopathy from the dehydration  His nephrotoxic medications were held  He was started on IVF  His renal failure resolved  He does have ckd3  His baseline is 1 7  His hypernatremia resolved with IVf  He was eating and drinking well at discharge  He did have alow-grade fever which is likely viral  His ua negative   His x-ray chest is normal   The patient is not having any cough   Patient had a flu shot this year  He was not treated with antibiotics  He was discharged back to [facility] dementia unit       Discharge Day Visit / Exam:     * Please refer to separate progress note for these details *    Discharge instructions/Information to patient and family:   See after visit summary for information provided to patient and family  Provisions for Follow-Up Care:  See after visit summary for information related to follow-up care and any pertinent home health orders  Discharge Statement:  I spent 32  minutes discharging the patient  This time was spent on the day of discharge  I had direct contact with the patient on the day of discharge  Greater than 50% of the total time was spent examining patient, answering all patient questions, arranging and discussing plan of care with patient as well as directly providing post-discharge instructions  Additional time then spent on discharge activities  Discharge Medications:  See after visit summary for reconciled discharge medications provided to patient and family

## 2017-11-21 NOTE — SOCIAL WORK
Attending reached out to CM to updated that patient is medically cleared today  CM scheduled a BLS transport, due to patient having dementia  Spoke to [email or web address] and transport set for 1:30  Informed: attending, RN, unit clerk, and daughter, Casimiro Davison  Also presented the IMM rights with daughter while on phone, and left copy in patient's room  [address] to update them on transport, and get numbers for report, but had to [facility]

## 2017-11-22 ENCOUNTER — GENERIC CONVERSION - ENCOUNTER (OUTPATIENT)
Dept: OTHER | Facility: OTHER | Age: 82
End: 2017-11-22

## 2017-11-24 ENCOUNTER — APPOINTMENT (EMERGENCY)
Dept: CT IMAGING | Facility: HOSPITAL | Age: 82
DRG: 872 | End: 2017-11-24
Payer: MEDICARE

## 2017-11-24 ENCOUNTER — APPOINTMENT (EMERGENCY)
Dept: RADIOLOGY | Facility: HOSPITAL | Age: 82
DRG: 872 | End: 2017-11-24
Payer: MEDICARE

## 2017-11-24 ENCOUNTER — HOSPITAL ENCOUNTER (INPATIENT)
Facility: HOSPITAL | Age: 82
LOS: 8 days | Discharge: NURSING, REHAB OR LONG-TERM CARE | DRG: 872 | End: 2017-12-02
Attending: EMERGENCY MEDICINE | Admitting: INTERNAL MEDICINE
Payer: MEDICARE

## 2017-11-24 DIAGNOSIS — A41.01 STAPHYLOCOCCUS AUREUS SEPTICEMIA (HCC): ICD-10-CM

## 2017-11-24 DIAGNOSIS — N17.9 ACUTE KIDNEY INJURY (HCC): ICD-10-CM

## 2017-11-24 DIAGNOSIS — R78.81 GRAM-POSITIVE BACTEREMIA: ICD-10-CM

## 2017-11-24 DIAGNOSIS — J18.9 PNEUMONIA: Primary | ICD-10-CM

## 2017-11-24 LAB
ALBUMIN SERPL BCP-MCNC: 3.2 G/DL (ref 3.5–5)
ALP SERPL-CCNC: 64 U/L (ref 46–116)
ALT SERPL W P-5'-P-CCNC: 27 U/L (ref 12–78)
ANION GAP SERPL CALCULATED.3IONS-SCNC: 8 MMOL/L (ref 4–13)
APTT PPP: 35 SECONDS (ref 23–35)
AST SERPL W P-5'-P-CCNC: 31 U/L (ref 5–45)
BACTERIA UR QL AUTO: ABNORMAL /HPF
BASOPHILS # BLD AUTO: 0.01 THOUSANDS/ΜL (ref 0–0.1)
BASOPHILS NFR BLD AUTO: 0 % (ref 0–1)
BILIRUB SERPL-MCNC: 0.83 MG/DL (ref 0.2–1)
BILIRUB UR QL STRIP: ABNORMAL
BUN SERPL-MCNC: 34 MG/DL (ref 5–25)
CALCIUM SERPL-MCNC: 8.7 MG/DL (ref 8.3–10.1)
CHLORIDE SERPL-SCNC: 104 MMOL/L (ref 100–108)
CLARITY UR: CLEAR
CO2 SERPL-SCNC: 26 MMOL/L (ref 21–32)
COLOR UR: YELLOW
CREAT SERPL-MCNC: 2.14 MG/DL (ref 0.6–1.3)
EOSINOPHIL # BLD AUTO: 0 THOUSAND/ΜL (ref 0–0.61)
EOSINOPHIL NFR BLD AUTO: 0 % (ref 0–6)
ERYTHROCYTE [DISTWIDTH] IN BLOOD BY AUTOMATED COUNT: 13.3 % (ref 11.6–15.1)
GFR SERPL CREATININE-BSD FRML MDRD: 26 ML/MIN/1.73SQ M
GLUCOSE SERPL-MCNC: 134 MG/DL (ref 65–140)
GLUCOSE UR STRIP-MCNC: NEGATIVE MG/DL
HCT VFR BLD AUTO: 32.2 % (ref 36.5–49.3)
HGB BLD-MCNC: 10.9 G/DL (ref 12–17)
HGB UR QL STRIP.AUTO: ABNORMAL
INR PPP: 1.09 (ref 0.86–1.16)
KETONES UR STRIP-MCNC: NEGATIVE MG/DL
LACTATE SERPL-SCNC: 2 MMOL/L (ref 0.5–2)
LEUKOCYTE ESTERASE UR QL STRIP: NEGATIVE
LYMPHOCYTES # BLD AUTO: 0.42 THOUSANDS/ΜL (ref 0.6–4.47)
LYMPHOCYTES NFR BLD AUTO: 4 % (ref 14–44)
MCH RBC QN AUTO: 31.9 PG (ref 26.8–34.3)
MCHC RBC AUTO-ENTMCNC: 33.9 G/DL (ref 31.4–37.4)
MCV RBC AUTO: 94 FL (ref 82–98)
MONOCYTES # BLD AUTO: 0.78 THOUSAND/ΜL (ref 0.17–1.22)
MONOCYTES NFR BLD AUTO: 8 % (ref 4–12)
MUCOUS THREADS UR QL AUTO: ABNORMAL
NEUTROPHILS # BLD AUTO: 8.57 THOUSANDS/ΜL (ref 1.85–7.62)
NEUTS SEG NFR BLD AUTO: 88 % (ref 43–75)
NITRITE UR QL STRIP: NEGATIVE
NON-SQ EPI CELLS URNS QL MICRO: ABNORMAL /HPF
NRBC BLD AUTO-RTO: 0 /100 WBCS
PH UR STRIP.AUTO: 5.5 [PH] (ref 4.5–8)
PLATELET # BLD AUTO: 167 THOUSANDS/UL (ref 149–390)
PMV BLD AUTO: 11 FL (ref 8.9–12.7)
POTASSIUM SERPL-SCNC: 3.9 MMOL/L (ref 3.5–5.3)
PROT SERPL-MCNC: 7 G/DL (ref 6.4–8.2)
PROT UR STRIP-MCNC: ABNORMAL MG/DL
PROTHROMBIN TIME: 14.1 SECONDS (ref 12.1–14.4)
RBC # BLD AUTO: 3.42 MILLION/UL (ref 3.88–5.62)
RBC #/AREA URNS AUTO: ABNORMAL /HPF
SODIUM SERPL-SCNC: 138 MMOL/L (ref 136–145)
SP GR UR STRIP.AUTO: 1.01 (ref 1–1.03)
SPECIMEN SOURCE: NORMAL
TROPONIN I BLD-MCNC: 0.02 NG/ML (ref 0–0.08)
UROBILINOGEN UR QL STRIP.AUTO: 4 E.U./DL
WBC # BLD AUTO: 9.78 THOUSAND/UL (ref 4.31–10.16)
WBC #/AREA URNS AUTO: ABNORMAL /HPF

## 2017-11-24 PROCEDURE — 36415 COLL VENOUS BLD VENIPUNCTURE: CPT | Performed by: EMERGENCY MEDICINE

## 2017-11-24 PROCEDURE — 81002 URINALYSIS NONAUTO W/O SCOPE: CPT | Performed by: EMERGENCY MEDICINE

## 2017-11-24 PROCEDURE — 87186 SC STD MICRODIL/AGAR DIL: CPT | Performed by: EMERGENCY MEDICINE

## 2017-11-24 PROCEDURE — 99285 EMERGENCY DEPT VISIT HI MDM: CPT

## 2017-11-24 PROCEDURE — 87081 CULTURE SCREEN ONLY: CPT | Performed by: PHYSICIAN ASSISTANT

## 2017-11-24 PROCEDURE — 87040 BLOOD CULTURE FOR BACTERIA: CPT | Performed by: EMERGENCY MEDICINE

## 2017-11-24 PROCEDURE — 93005 ELECTROCARDIOGRAM TRACING: CPT | Performed by: EMERGENCY MEDICINE

## 2017-11-24 PROCEDURE — 96365 THER/PROPH/DIAG IV INF INIT: CPT

## 2017-11-24 PROCEDURE — 85610 PROTHROMBIN TIME: CPT | Performed by: EMERGENCY MEDICINE

## 2017-11-24 PROCEDURE — 83605 ASSAY OF LACTIC ACID: CPT | Performed by: EMERGENCY MEDICINE

## 2017-11-24 PROCEDURE — 84484 ASSAY OF TROPONIN QUANT: CPT

## 2017-11-24 PROCEDURE — 71010 HB CHEST X-RAY 1 VIEW FRONTAL (PORTABLE): CPT

## 2017-11-24 PROCEDURE — 74176 CT ABD & PELVIS W/O CONTRAST: CPT

## 2017-11-24 PROCEDURE — 87147 CULTURE TYPE IMMUNOLOGIC: CPT | Performed by: EMERGENCY MEDICINE

## 2017-11-24 PROCEDURE — 81001 URINALYSIS AUTO W/SCOPE: CPT

## 2017-11-24 PROCEDURE — 85025 COMPLETE CBC W/AUTO DIFF WBC: CPT | Performed by: EMERGENCY MEDICINE

## 2017-11-24 PROCEDURE — 85730 THROMBOPLASTIN TIME PARTIAL: CPT | Performed by: EMERGENCY MEDICINE

## 2017-11-24 PROCEDURE — 80053 COMPREHEN METABOLIC PANEL: CPT | Performed by: EMERGENCY MEDICINE

## 2017-11-24 RX ORDER — VANCOMYCIN HYDROCHLORIDE 1 G/200ML
10 INJECTION, SOLUTION INTRAVENOUS EVERY 12 HOURS
Status: DISCONTINUED | OUTPATIENT
Start: 2017-11-25 | End: 2017-11-24 | Stop reason: DRUGHIGH

## 2017-11-24 RX ORDER — VANCOMYCIN HYDROCHLORIDE 1 G/200ML
1000 INJECTION, SOLUTION INTRAVENOUS EVERY 24 HOURS
Status: DISCONTINUED | OUTPATIENT
Start: 2017-11-25 | End: 2017-11-28

## 2017-11-24 RX ORDER — METOPROLOL TARTRATE 5 MG/5ML
5 INJECTION INTRAVENOUS EVERY 6 HOURS PRN
Status: DISCONTINUED | OUTPATIENT
Start: 2017-11-24 | End: 2017-12-02 | Stop reason: HOSPADM

## 2017-11-24 RX ORDER — LOPERAMIDE HYDROCHLORIDE 2 MG/1
2 CAPSULE ORAL 3 TIMES DAILY PRN
Status: DISCONTINUED | OUTPATIENT
Start: 2017-11-24 | End: 2017-12-02 | Stop reason: HOSPADM

## 2017-11-24 RX ORDER — AZITHROMYCIN 250 MG/1
250 TABLET, FILM COATED ORAL EVERY 24 HOURS
COMMUNITY
End: 2017-12-11 | Stop reason: ALTCHOICE

## 2017-11-24 RX ORDER — ACETAMINOPHEN 325 MG/1
650 TABLET ORAL EVERY 6 HOURS PRN
Status: DISCONTINUED | OUTPATIENT
Start: 2017-11-24 | End: 2017-12-02 | Stop reason: HOSPADM

## 2017-11-24 RX ORDER — HEPARIN SODIUM 5000 [USP'U]/ML
5000 INJECTION, SOLUTION INTRAVENOUS; SUBCUTANEOUS EVERY 8 HOURS SCHEDULED
Status: DISCONTINUED | OUTPATIENT
Start: 2017-11-24 | End: 2017-12-02 | Stop reason: HOSPADM

## 2017-11-24 RX ORDER — PRAVASTATIN SODIUM 40 MG
40 TABLET ORAL
Status: DISCONTINUED | OUTPATIENT
Start: 2017-11-24 | End: 2017-12-02 | Stop reason: HOSPADM

## 2017-11-24 RX ORDER — MEMANTINE HYDROCHLORIDE 5 MG/1
10 TABLET ORAL 2 TIMES DAILY
Status: DISCONTINUED | OUTPATIENT
Start: 2017-11-25 | End: 2017-12-02 | Stop reason: HOSPADM

## 2017-11-24 RX ORDER — CALCITRIOL 0.25 UG/1
0.25 CAPSULE, LIQUID FILLED ORAL EVERY OTHER DAY
Status: DISCONTINUED | OUTPATIENT
Start: 2017-11-24 | End: 2017-12-02 | Stop reason: HOSPADM

## 2017-11-24 RX ORDER — MAGNESIUM HYDROXIDE/ALUMINUM HYDROXICE/SIMETHICONE 120; 1200; 1200 MG/30ML; MG/30ML; MG/30ML
30 SUSPENSION ORAL EVERY 6 HOURS PRN
Status: DISCONTINUED | OUTPATIENT
Start: 2017-11-24 | End: 2017-12-02 | Stop reason: HOSPADM

## 2017-11-24 RX ORDER — ACETAMINOPHEN 650 MG/1
650 SUPPOSITORY RECTAL ONCE
Status: COMPLETED | OUTPATIENT
Start: 2017-11-24 | End: 2017-11-24

## 2017-11-24 RX ORDER — ONDANSETRON 2 MG/ML
4 INJECTION INTRAMUSCULAR; INTRAVENOUS EVERY 6 HOURS PRN
Status: DISCONTINUED | OUTPATIENT
Start: 2017-11-24 | End: 2017-12-02 | Stop reason: HOSPADM

## 2017-11-24 RX ORDER — LEVOTHYROXINE SODIUM 0.12 MG/1
125 TABLET ORAL DAILY
Status: DISCONTINUED | OUTPATIENT
Start: 2017-11-25 | End: 2017-12-02 | Stop reason: HOSPADM

## 2017-11-24 RX ORDER — NYSTATIN 100000 [USP'U]/G
POWDER TOPICAL 2 TIMES DAILY
Status: DISCONTINUED | OUTPATIENT
Start: 2017-11-24 | End: 2017-12-02 | Stop reason: HOSPADM

## 2017-11-24 RX ORDER — SODIUM CHLORIDE 9 MG/ML
100 INJECTION, SOLUTION INTRAVENOUS CONTINUOUS
Status: DISCONTINUED | OUTPATIENT
Start: 2017-11-24 | End: 2017-11-25

## 2017-11-24 RX ORDER — DONEPEZIL HYDROCHLORIDE 5 MG/1
5 TABLET, FILM COATED ORAL 2 TIMES DAILY
Status: DISCONTINUED | OUTPATIENT
Start: 2017-11-25 | End: 2017-12-02 | Stop reason: HOSPADM

## 2017-11-24 RX ADMIN — SODIUM CHLORIDE 1000 ML: 0.9 INJECTION, SOLUTION INTRAVENOUS at 14:08

## 2017-11-24 RX ADMIN — SODIUM CHLORIDE 100 ML/HR: 0.9 INJECTION, SOLUTION INTRAVENOUS at 20:48

## 2017-11-24 RX ADMIN — ACETAMINOPHEN 650 MG: 325 TABLET ORAL at 20:57

## 2017-11-24 RX ADMIN — CEFEPIME HYDROCHLORIDE 2000 MG: 2 INJECTION, SOLUTION INTRAVENOUS at 14:12

## 2017-11-24 RX ADMIN — NYSTATIN: 100000 POWDER TOPICAL at 20:56

## 2017-11-24 RX ADMIN — VANCOMYCIN HYDROCHLORIDE 1500 MG: 1 INJECTION, POWDER, LYOPHILIZED, FOR SOLUTION INTRAVENOUS at 15:54

## 2017-11-24 RX ADMIN — HEPARIN SODIUM 5000 UNITS: 5000 INJECTION, SOLUTION INTRAVENOUS; SUBCUTANEOUS at 22:29

## 2017-11-24 RX ADMIN — ACETAMINOPHEN 650 MG: 650 SUPPOSITORY RECTAL at 12:02

## 2017-11-24 NOTE — ED PROVIDER NOTES
History  Chief Complaint   Patient presents with    Fever - 9 weeks to 74 years     per EMS ([facility]) pt comes from [facility] after being discharged with URI started on "z-pack" staff at [facility] reports increased lethargy and fever today  per EMS pt is on a locked dementia unit but staff reported increased confusion and lethargy     79 yo male resident of locked adult dementia unit, brought to the ED for evaluation of fever, altered mental status, and "increased confusion "  Patient is known to me from recent ED evaluation 11/18 for altered mental status when I admitted for acute kidney injury and hypernatremia  His daughter at that time affirmed he is DNR/DNI and not seeking aggressive measures for new medical conditions  He was discharged with Zpack to the facility  He has no complaints when I ask, him, but nor is he oriented or able to provide any relevant details of recent events and care  He is actually more alert and responsive than my previous experience with his baseline mental status  He will follow simple commands        History provided by:  Nursing home  History limited by:  Dementia  Fever - 9 weeks to 74 years   Max temp prior to arrival:  101      Prior to Admission Medications   Prescriptions Last Dose Informant Patient Reported? Taking? LEVOTHYROXINE SODIUM PO   Yes Yes   Sig: Take 0 125 mg by mouth daily     Memantine HCl-Donepezil HCl (NAMZARIC) 28-10 MG CP24   Yes Yes   Sig: Take 1 capsule by mouth   azithromycin (ZITHROMAX) 250 mg tablet   Yes Yes   Sig: Take 250 mg by mouth every 24 hours   calcitriol (ROCALTROL) 0 25 mcg capsule   Yes Yes   Sig: Take 0 25 mcg by mouth every other day 3 times per week ( M/W/F)    febuxostat (ULORIC) 40 mg tablet   Yes Yes   Sig: Take by mouth   furosemide (LASIX) 20 mg tablet   No Yes   Sig: Take 1 tablet by mouth daily as needed (edema)   loperamide (IMODIUM) 2 mg capsule   Yes Yes   Sig: Take 2 mg by mouth as needed for diarrhea nystatin (MYCOSTATIN) powder   No Yes   Sig: Apply topically 2 (two) times a day   simvastatin (ZOCOR) 20 mg tablet   Yes Yes   Sig: Take 20 mg by mouth daily at bedtime  Facility-Administered Medications: None       Past Medical History:   Diagnosis Date    KULWINDER (acute kidney injury) ([place] [id number] ) 11/18/2017    Ambulatory dysfunction     Arthritis     CKD (chronic kidney disease) stage 4, GFR 15-29 ml/min ([facility])     CVA (cerebral vascular accident) ([place] [address] )     Dementia     Disease of thyroid gland     Elevated blood uric acid level     GERD (gastroesophageal reflux disease)     GI bleed     Gout 11/18/2017    HTN (hypertension), benign     Hyperlipidemia     Hyperlipidemia     Hypertension     Hypothyroid     Normocytic anemia     PAD (peripheral artery disease) ([facility])     Pulmonary embolism ([facility])     Renal disorder     Secondary hyperparathyroidism ([place] [address] )     Stroke ([place] [address] )     Vitamin D deficiency        Past Surgical History:   Procedure Laterality Date    NO PAST SURGERIES         Family History   Problem Relation Age of Onset    Diabetes Mother     Cancer Father      I have reviewed and agree with the history as documented  Social History   Substance Use Topics    Smoking status: Never Smoker    Smokeless tobacco: Never Used    Alcohol use Yes      Comment: socially         Review of Systems   Unable to perform ROS: Dementia   Constitutional: Positive for fever         Physical Exam  ED Triage Vitals [11/24/17 1132]   Temperature Pulse Respirations Blood Pressure SpO2   (!) 101 4 °F (38 6 °C) 88 18 (!) 174/76 95 %      Temp Source Heart Rate Source Patient Position - Orthostatic VS BP Location FiO2 (%)   Rectal Monitor Lying Left arm --      Pain Score       --           Orthostatic Vital Signs  Vitals:    11/24/17 1132 11/24/17 1330 11/24/17 1444   BP: (!) 174/76 (!) 177/82 (!) 176/78   Pulse: 88 86 (!) 121   Patient Position - Orthostatic VS: Lying Lying Lying       Physical Exam Constitutional: He is oriented to person, place, and time  He appears well-developed and well-nourished  He appears listless  HENT:   Head: Normocephalic and atraumatic  Right Ear: External ear normal    Left Ear: External ear normal    Nose: Nose normal    Mouth/Throat: Oropharynx is clear and moist  Mucous membranes are dry  Eyes: Conjunctivae and EOM are normal  Pupils are equal, round, and reactive to light  Neck: Normal range of motion  Neck supple  Cardiovascular: Normal rate  Pulmonary/Chest: Effort normal    Abdominal: Soft  He exhibits distension  There is generalized tenderness  Musculoskeletal: Normal range of motion  Neurological: He is oriented to person, place, and time  He appears listless  No cranial nerve deficit  Coordination normal  GCS eye subscore is 4  GCS verbal subscore is 4  GCS motor subscore is 6  Skin: Skin is warm and dry  Psychiatric: His speech is delayed  He is slowed  Cognition and memory are impaired  Nursing note and vitals reviewed        ED Medications  Medications   vancomycin (VANCOCIN) 1,500 mg in sodium chloride 0 9 % 250 mL IVPB (not administered)   acetaminophen (TYLENOL) rectal suppository 650 mg (650 mg Rectal Given 11/24/17 1202)   sodium chloride 0 9 % bolus 1,000 mL (1,000 mL Intravenous New Bag 11/24/17 1408)   cefepime (MAXIPIME) 2 g/50 mL dextrose IVPB (2,000 mg Intravenous New Bag 11/24/17 1412)       Diagnostic Studies  Results Reviewed     Procedure Component Value Units Date/Time    Urine Microscopic [[id number]]  (Abnormal) Collected:  11/24/17 1406    Lab Status:  Final result Specimen:  Urine from Urine, Other Updated:  11/24/17 1412     RBC, UA 4-10 (A) /hpf      WBC, UA None Seen /hpf      Epithelial Cells Occasional /hpf      Bacteria, UA Occasional /hpf      MUCOUS THREADS Occasional    POCT urinalysis dipstick [[id number]]  (Abnormal) Resulted:  11/24/17 1352    Lab Status:  Final result Specimen:  Urine Updated:  11/24/17 1352    ED Urine Macroscopic [[id number]]  (Abnormal) Collected:  11/24/17 1406    Lab Status:  Final result Specimen:  Urine Updated:  11/24/17 1351     Color, UA Yellow     Clarity, UA Clear     pH, UA 5 5     Leukocytes, UA Negative     Nitrite, UA Negative     Protein,  (2+) (A) mg/dl      Glucose, UA Negative mg/dl      Ketones, UA Negative mg/dl      Urobilinogen, UA 4 0 (A) E U /dl      Bilirubin, UA Interference- unable to analyze (A)     Blood, UA Small (A)     Specific [place], UA 1 015    Narrative:       CLINITEK RESULT    Blood culture #2 [[id number]] Collected:  11/24/17 1202    Lab Status: In process Specimen:  Blood from Arm, Right Updated:  11/24/17 1238    Blood culture #1 [[id number]] Collected:  11/24/17 1203    Lab Status: In process Specimen:  Blood from Arm, Left Updated:  11/24/17 1237    Lactic Acid x2 [[id number]]  (Normal) Collected:  11/24/17 1202    Lab Status:  Final result Specimen:  Blood from Arm, Right Updated:  11/24/17 1237     LACTIC ACID 2 0 mmol/L     Narrative:         Result may be elevated if tourniquet was used during collection  Comprehensive metabolic panel [[id number]]  (Abnormal) Collected:  11/24/17 1202    Lab Status:  Final result Specimen:  Blood from Arm, Right Updated:  11/24/17 1230     Sodium 138 mmol/L      Potassium 3 9 mmol/L      Chloride 104 mmol/L      CO2 26 mmol/L      Anion Gap 8 mmol/L      BUN 34 (H) mg/dL      Creatinine 2 14 (H) mg/dL      Glucose 134 mg/dL      Calcium 8 7 mg/dL      AST 31 U/L      ALT 27 U/L      Alkaline Phosphatase 64 U/L      Total Protein 7 0 g/dL      Albumin 3 2 (L) g/dL      Total Bilirubin 0 83 mg/dL      eGFR 26 ml/min/1 73sq m     Narrative:         National Kidney Disease Education Program recommendations are as follows:  GFR calculation is accurate only with a steady state creatinine  Chronic Kidney disease less than 60 ml/min/1 73 sq  meters  Kidney failure less than 15 ml/min/1 73 sq  meters      APTT [[id number]]  (Normal) Collected:  11/24/17 1202    Lab Status:  Final result Specimen:  Blood from Arm, Right Updated:  11/24/17 1225     PTT 35 seconds     Narrative: Therapeutic Heparin Range = 60-90 seconds    Protime-INR [[id number]]  (Normal) Collected:  11/24/17 1202    Lab Status:  Final result Specimen:  Blood from Arm, Right Updated:  11/24/17 1225     Protime 14 1 seconds      INR 1 09    CBC and differential [[id number]]  (Abnormal) Collected:  11/24/17 1202    Lab Status:  Final result Specimen:  Blood from Arm, Right Updated:  11/24/17 1220     WBC 9 78 Thousand/uL      RBC 3 42 (L) Million/uL      Hemoglobin 10 9 (L) g/dL      Hematocrit 32 2 (L) %      MCV 94 fL      MCH 31 9 pg      MCHC 33 9 g/dL      RDW 13 3 %      MPV 11 0 fL      Platelets 180 Thousands/uL      nRBC 0 /100 WBCs      Neutrophils Relative 88 (H) %      Lymphocytes Relative 4 (L) %      Monocytes Relative 8 %      Eosinophils Relative 0 %      Basophils Relative 0 %      Neutrophils Absolute 8 57 (H) Thousands/µL      Lymphocytes Absolute 0 42 (L) Thousands/µL      Monocytes Absolute 0 78 Thousand/µL      Eosinophils Absolute 0 00 Thousand/µL      Basophils Absolute 0 01 Thousands/µL     POCT troponin [[id number]]  (Normal) Collected:  11/24/17 1151    Lab Status:  Final result Updated:  11/24/17 1204     POC Troponin I 0 02 ng/ml      Specimen Type VENOUS    Narrative:         [employer] i-Stat handheld analyzer 99% cutoff is > 0 08ng/mL in [facility] Emergency Departments    o cTnI 99% cutoff is useful only when applied to patients in the clinical setting of myocardial ischemia  o cTnI 99% cutoff should be interpreted in the context of clinical history, ECG findings and possibly cardiac imaging to establish correct diagnosis  o cTnI 99% cutoff may be suggestive but clearly not indicative of a coronary event without the clinical setting of myocardial ischemia                   CT abdomen pelvis wo contrast   Final Result by Don Henson MD (11/24 2861) Limited exam due to motion and without IV and oral contrast       1   No definite acute findings in the abdomen or pelvis  2    Colonic diverticulosis  Gallstones  3   Partially calcified mesenteric nodule in the right hemiabdomen, nonspecific but can be seen with entities such as carcinoid disease  Correlate clinically  4   Enlarged prostate  Bladder diverticula  5   Moderate-sized hiatal hernia  Patchy consolidation in the right lower lobe posteriorly may represent atelectasis or infiltrate  Minimal consolidation in the left lower lobe posteriorly  Workstation performed: [id number]         XR chest 1 view portable   ED Interpretation by Da Roman MD (11/24 1405)   Right middle lobe infiltrate      Final Result by Finn Tavares MD (11/24 1442)      Minimal platelike atelectasis right lung         Workstation performed: [id number]                    Procedures  Procedures       Phone Contacts  ED Phone Contact    ED Course  ED Course as of Nov 24 1511 Fri Nov 24, 2017   1318 Elevated over recent baseline Creatinine: (!) 2 14   1318 normal LACTIC ACID: 2 0   1511 Left message with his emergency contact Loida Archuleta                          Initial Sepsis Screening     [address] Name 11/24/17 1447                Is the patient's history suggestive of a new or worsening infection?         Suspected source of infection pneumonia  -RM        Are two or more of the following signs & symptoms of infection both present and new to the patient?  No  -RM        Indicate SIRS criteria Hyperthemia > 38 3C (100 9F)  -RM        If the answer is yes to both questions, suspicion of sepsis is present          If severe sepsis is present AND tissue hypoperfusion perists in the hour after fluid resuscitation or lactate > 4, the patient meets criteria for SEPTIC SHOCK          Are any of the following organ dysfunction criteria present within 6 hours of suspected infection and SIRS criteria that are NOT considered to be chronic conditions? No  -RM        Organ dysfunction          Date of presentation of severe sepsis          Time of presentation of severe sepsis          Tissue hypoperfusion persists in the hour after crystalloid fluid administration, evidenced, by either:          Was hypotension present within one hour of the conclusion of crystalloid fluid administration?         Date of presentation of septic shock          Time of presentation of septic shock            User Key  (r) = Recorded By, (t) = Taken By, (c) = Cosigned By    Initials Name Provider Type    RM Dia Ulloa MD Physician                  [facility]  CritCare Time    Disposition  Final diagnoses:   Pneumonia   Acute kidney injury [facility])     Time reflects when diagnosis was documented in both MDM as applicable and the Disposition within this note     Time User Action Codes Description Comment    11/24/2017  2:50 PM Loretta Pineda Add [J18 9] Pneumonia     11/24/2017  2:50 PM Rick ALEGRE Add [N17 9] Acute kidney injury [facility])       ED Disposition     ED Disposition Condition Comment    Admit  Case was discussed with [employer], PAC and the patient's admission status was agreed to be Admission Status: inpatient status to the service of Dr Isidra Eubanks   Follow-up Information    None       Patient's Medications   Discharge Prescriptions    No medications on file     No discharge procedures on file      ED Provider  Electronically Signed by           Dia Ulloa MD  11/24/17 [address] [facility] Otho, MD  11/24/17 5418

## 2017-11-24 NOTE — ED NOTES
Dr Zara Colmenares aware pts rectal temp already medicated with tylenol rectal, pt unable to stay awake or follow commands to pass dysphagia screening     Anastacio Schaeffer, RN  11/24/17 4801

## 2017-11-24 NOTE — ED NOTES
[facility] back at [phone], ask to speak with Nurse Leigh Sheppard when disposition is reached       Randa Posada RN  11/24/17 4231

## 2017-11-24 NOTE — PLAN OF CARE
INFECTION - ADULT     Absence or prevention of progression during hospitalization Progressing     Absence of fever/infection during neutropenic period Progressing        Nutrition/Hydration-ADULT     Nutrient/Hydration intake appropriate for improving, restoring or maintaining nutritional needs Progressing        PAIN - ADULT     Verbalizes/displays adequate comfort level or baseline comfort level Progressing        Potential for Falls     Patient will remain free of falls Progressing        Prexisting or High Potential for Compromised Skin Integrity     Skin integrity is maintained or improved Progressing        SAFETY ADULT     Maintain or return to baseline ADL function Progressing     Maintain or return mobility status to optimal level Progressing

## 2017-11-25 PROBLEM — R78.81 GRAM-POSITIVE BACTEREMIA: Status: ACTIVE | Noted: 2017-11-25

## 2017-11-25 PROBLEM — N18.30 CKD (CHRONIC KIDNEY DISEASE) STAGE 3, GFR 30-59 ML/MIN (HCC): Status: ACTIVE | Noted: 2017-11-25

## 2017-11-25 LAB
ATRIAL RATE: 90 BPM
GLUCOSE SERPL-MCNC: 86 MG/DL (ref 65–140)
P AXIS: 90 DEGREES
PR INTERVAL: 284 MS
QRS AXIS: 1 DEGREES
QRSD INTERVAL: 90 MS
QT INTERVAL: 338 MS
QTC INTERVAL: 413 MS
T WAVE AXIS: 54 DEGREES
VENTRICULAR RATE: 90 BPM

## 2017-11-25 PROCEDURE — G8979 MOBILITY GOAL STATUS: HCPCS

## 2017-11-25 PROCEDURE — 87040 BLOOD CULTURE FOR BACTERIA: CPT | Performed by: INTERNAL MEDICINE

## 2017-11-25 PROCEDURE — 82948 REAGENT STRIP/BLOOD GLUCOSE: CPT

## 2017-11-25 PROCEDURE — G8978 MOBILITY CURRENT STATUS: HCPCS

## 2017-11-25 PROCEDURE — 87147 CULTURE TYPE IMMUNOLOGIC: CPT | Performed by: INTERNAL MEDICINE

## 2017-11-25 PROCEDURE — 97163 PT EVAL HIGH COMPLEX 45 MIN: CPT

## 2017-11-25 RX ORDER — AMLODIPINE BESYLATE 5 MG/1
5 TABLET ORAL DAILY
Status: DISCONTINUED | OUTPATIENT
Start: 2017-11-25 | End: 2017-11-26

## 2017-11-25 RX ORDER — SODIUM CHLORIDE 9 MG/ML
50 INJECTION, SOLUTION INTRAVENOUS CONTINUOUS
Status: DISCONTINUED | OUTPATIENT
Start: 2017-11-25 | End: 2017-11-28

## 2017-11-25 RX ADMIN — NYSTATIN: 100000 POWDER TOPICAL at 17:48

## 2017-11-25 RX ADMIN — DONEPEZIL HYDROCHLORIDE 5 MG: 5 TABLET, FILM COATED ORAL at 17:47

## 2017-11-25 RX ADMIN — AMLODIPINE BESYLATE 5 MG: 5 TABLET ORAL at 14:30

## 2017-11-25 RX ADMIN — SODIUM CHLORIDE 100 ML/HR: 0.9 INJECTION, SOLUTION INTRAVENOUS at 07:40

## 2017-11-25 RX ADMIN — MEMANTINE 10 MG: 5 TABLET ORAL at 17:47

## 2017-11-25 RX ADMIN — CEFTRIAXONE 1000 MG: 1 INJECTION, SOLUTION INTRAVENOUS at 14:37

## 2017-11-25 RX ADMIN — HEPARIN SODIUM 5000 UNITS: 5000 INJECTION, SOLUTION INTRAVENOUS; SUBCUTANEOUS at 14:30

## 2017-11-25 RX ADMIN — HEPARIN SODIUM 5000 UNITS: 5000 INJECTION, SOLUTION INTRAVENOUS; SUBCUTANEOUS at 05:25

## 2017-11-25 RX ADMIN — SODIUM CHLORIDE 50 ML/HR: 0.9 INJECTION, SOLUTION INTRAVENOUS at 22:23

## 2017-11-25 RX ADMIN — NYSTATIN: 100000 POWDER TOPICAL at 14:37

## 2017-11-25 RX ADMIN — HEPARIN SODIUM 5000 UNITS: 5000 INJECTION, SOLUTION INTRAVENOUS; SUBCUTANEOUS at 21:45

## 2017-11-25 RX ADMIN — VANCOMYCIN HYDROCHLORIDE 1000 MG: 1 INJECTION, SOLUTION INTRAVENOUS at 17:53

## 2017-11-25 RX ADMIN — PRAVASTATIN SODIUM 40 MG: 40 TABLET ORAL at 17:47

## 2017-11-25 NOTE — PROGRESS NOTES
Paged on call karri Reyes, about concern with patient swallowing pills or crushing the pills in applesauce since patient is lethargic  Told to hold levothyroxine until morning blood work comes back

## 2017-11-25 NOTE — PROGRESS NOTES
[facility] Progress Note  Patient: Robb De La Torre 80 y o  male   MRN: [id number]  PCP: Azael Trimble MD  Unit/Bed#: [facility] 68 2 [facility] 87 205-02 Encounter: [id number]  Date Of Visit: 11/25/17    Assessment  Principal Problem:    Pneumonia  Active Problems:    GERD (gastroesophageal reflux disease)    Normocytic anemia    Hypothyroid    HTN (hypertension), benign    Dementia    Ambulatory dysfunction    Hyperlipidemia    Gram-positive bacteremia    CKD (chronic kidney disease) stage 3, GFR 30-59 ml/min  Resolved Problems:    * No resolved hospital problems  *        Plan  1  G positive bacteremia  Unclear source  Recent hospitalization  Have Infectious Disease evaluate  Restart vancomycin until species available  2  Healthcare acquired pneumonia  Patient's presentation with fever from facility without significant respiratory symptoms ? Atelectasis, seen on both chest x-ray and CT scan of abdomen  3  Essential hypertension  Off losartan HCT secondary to kidney disease  Will add amlodipine  4  Chronic kidney disease stage 3  Remains at baseline  5  Hypothyroidism  Levothyroxine  6  Dementia  Continue donepezil and Namenda    VTE Pharmacologic Prophylaxis: Heparin    Patient Centered Rounds: I have performed bedside rounds with nursing staff today  Discussions with Specialists or Other Care Team Provider:     Education and Discussions with Family / Patient:  Discussed with patient's daughter Beatrice Cristal    Time Spent for Care: 35 mins  More than 50% of total time spent on counseling and coordination of care as described above      Current Length of Stay: 1 day(s)    Current Patient Status: Inpatient   Certification Statement: The patient will continue to require additional inpatient hospital stay due to bacteremia    Discharge Plan / Estimated Discharge Date:  Undetermined    Code Status: Level 3 - DNAR and DNI  ______________________________________________________________________________    Subjective:   Patient seen and examined  Demented, oriented only to self  No new complaints  Denies any pain  Objective:   Vitals: Blood pressure 160/82, pulse 66, temperature 98 2 °F (36 8 °C), temperature source Tympanic, resp  rate 18, weight 102 kg (225 lb 15 5 oz), SpO2 100 %      Physical Exam:   General appearance: appears stated age, cooperative and no distress  Head: Normocephalic, without obvious abnormality, atraumatic  Lungs: diminished breath sounds bibasilar  Heart: regular rate and rhythm  Abdomen: soft, non-tender; bowel sounds normal; no masses,  no organomegaly  Back: negative, range of motion normal  Extremities: edema trace to 1+ lower extremities  Neurologic:  Strength symmetric    Additional Data:   Labs:    Results from last 7 days  Lab Units 11/24/17  1202 11/18/17  1847   WBC Thousand/uL 9 78 8 13   HEMOGLOBIN g/dL 10 9* 11 8*   HEMATOCRIT % 32 2* 35 1*   MCV fL 94 94   PLATELETS Thousands/uL 167 253   INR  1 09 0 99       Results from last 7 days  Lab Units 11/24/17  1202 11/21/17  0443 11/20/17  0608 11/19/17  0456 11/18/17  1847   SODIUM mmol/L 138 141 142 148* 149*   POTASSIUM mmol/L 3 9 4 0 3 6 3 6 4 1   CHLORIDE mmol/L 104 107 107 111* 108   CO2 mmol/L 26 28 26 28 32   ANION GAP mmol/L 8 6 9 9 9   BUN mg/dL 34* 40* 47* 58* 61*   CREATININE mg/dL 2 14* 1 71* 1 84* 2 45* 2 93*   CALCIUM mg/dL 8 7 8 1* 8 2* 8 6 9 1   ALBUMIN g/dL 3 2*  --   --   --  4 0   BILIRUBIN TOTAL mg/dL 0 83  --   --   --  0 75   ALK PHOS U/L 64  --   --   --  67   ALT U/L 27  --   --   --  28   AST U/L 31  --   --   --  21   EGFR ml/min/1 73sq m 26 34 31 22 17   GLUCOSE RANDOM mg/dL 134 91 85 93 103                    Results from last 7 days  Lab Units 11/24/17  1202   LACTIC ACID mmol/L 2 0       Results from last 7 days  Lab Units 11/25/17  0213   POC GLUCOSE mg/dl 86             * I Have Reviewed All Lab Data Listed Above     Cultures:     Results from last 7 days  Lab Units 11/24/17  1203 11/24/17  1202   GRAM STAIN RESULT  Gram positive cocci in clusters Gram positive cocci in clusters         Imaging:  Imaging Reports Reviewed Today Include:     Ct Abdomen Pelvis Wo Contrast    Result Date: 11/24/2017  Narrative: CT ABDOMEN AND PELVIS WITHOUT IV CONTRAST INDICATION:  Abdominal pain  Lethargy, fever  COMPARISON: None  TECHNIQUE:  CT examination of the abdomen and pelvis was performed without intravenous contrast   Reformatted images were created in axial, sagittal, and coronal planes  Radiation dose length product (DLP) for this visit:  1106 mGy-cm   This examination, like all CT scans performed in the [facility], was performed utilizing techniques to minimize radiation dose exposure, including the use of iterative reconstruction and automated exposure control  Enteric contrast was not administered  FINDINGS: Overall exam is somewhat suboptimal due to motion  ABDOMEN LOWER CHEST:  There is a moderate-sized hiatal hernia  Patchy consolidation in the right lower lobe posteriorly may represent atelectasis or infiltrate  Minimal consolidation in the left lower lobe posteriorly  LIVER/BILIARY TREE:  Unremarkable  GALLBLADDER:  Several gallstones in the gallbladder  SPLEEN:  Unremarkable  PANCREAS:  Unremarkable  ADRENAL GLANDS:  Unremarkable  KIDNEYS/URETERS:  Bilateral perinephric stranding is nonspecific  No hydronephrosis  STOMACH AND BOWEL:  Limited evaluation without contrast   No bowel obstruction  Colonic diverticulosis without definite acute diverticulitis  APPENDIX:  No findings to suggest appendicitis  ABDOMINOPELVIC CAVITY:  No free air or free fluid  Partially calcified mesenteric nodule identified in the right hemiabdomen measuring 1 8 x 1 2 cm, image 54 series 2  VESSELS:  Unremarkable for patient's age  PELVIS REPRODUCTIVE ORGANS:  Prostate is enlarged   URINARY BLADDER:  Bladder diverticula noted bilaterally measuring up to 2 3 cm on the right  ABDOMINAL WALL/INGUINAL REGIONS:  Moderate-sized fat-containing left inguinal hernia  OSSEOUS STRUCTURES:  No acute fracture or destructive osseous lesion  Impression: Limited exam due to motion and without IV and oral contrast  1   No definite acute findings in the abdomen or pelvis  2    Colonic diverticulosis  Gallstones  3   Partially calcified mesenteric nodule in the right hemiabdomen, nonspecific but can be seen with entities such as carcinoid disease  Correlate clinically  4   Enlarged prostate  Bladder diverticula  5   Moderate-sized hiatal hernia  Patchy consolidation in the right lower lobe posteriorly may represent atelectasis or infiltrate  Minimal consolidation in the left lower lobe posteriorly   Workstation performed: [id number]     Xr Chest 1 View Portable  Result Date: 11/24/2017  Impression: Minimal platelike atelectasis right lung Workstation performed: [id number]           Scheduled Meds:  calcitriol 0 25 mcg Oral Every Other Day   cefTRIAXone 1,000 mg Intravenous Q24H   memantine 10 mg Oral BID   And      donepezil 5 mg Oral BID   heparin (porcine) 5,000 Units Subcutaneous Q8H [address]   levothyroxine 125 mcg Oral Daily   nystatin  Topical BID   pravastatin 40 mg Oral Daily With Dinner   vancomycin 1,000 mg Intravenous Q24H     Continuous Infusions:  sodium chloride 100 mL/hr Last Rate: 100 mL/hr (11/25/17 5440)     PRN Meds:    acetaminophen    aluminum-magnesium hydroxide-simethicone    loperamide    metoprolol    ondansetron     Darryl Adame DO

## 2017-11-25 NOTE — CONSULTS
Consultation - Infectious Disease   Jamee Light 80 y o  male MRN: [id number]  Unit/Bed#: [place] 2 -02 Encounter: [id number]      IMPRESSION & RECOMMENDATIONS:   Impression/Recommendations:  1  Sepsis, POA  Fevers, tachycardia  Secondary to #2  Tachycardia improving  Fevers trending down as well  Otherwise, patient is nontoxic appearing and hemodynamically stable  -  Antibiotics as below  - monitor WBC count and temperatures closely  - monitor hemodynamics  - follow-up blood culture results    2  Gram-positive bacteremia  Unclear source at this point  Patient did have a recent hospitalization for KULWINDER and received IV fluids, but no other recent interventions  CT abdomen and pelvis negative for any acute disease  Showed a patchy consolidation in the right lower lobe which is more likely atelectasis as patient not having any respiratory symptoms  Reviewed patient's medical history and do not see any history of hardware in his body, and he denies any as well  Will await final blood culture results to help indicate possible origin of infection  - continue with vancomycin  - check vancomycin trough prior to 4th dose  - follow-up blood cultures drawn on admission 11/24  - send repeat set x2 of blood cultures today  - will likely require TTE to be checked    3  KULWINDER on CKD  Creatinine elevated at 2 1  In the setting of poor p o  Intake and #2     - fluid management per primary team  - monitor creatinine  -  Adjust vancomycin dose based on renal function    4  Right lower lobe opacity  I suspect that this is more likely atelectasis rather than pneumonia  The patient did not present with any significant respiratory symptoms and O2 saturations are stable  - Discontinue ceftriaxone  -  Monitor respiratory status closely  - Monitor for any new respiratory symptoms    4  Advanced dementia  Patient lives in a locked dementia unit  5  History of gout   No clinical signs to suggest acute gout on physical exam   -  Serial exams      Antibiotics:  Vancomycin/ cefepime D2    Thank you for this consultation  We will follow along with you  HISTORY OF PRESENT ILLNESS:  Reason for Consult:  bacteremia    HPI: Gill Mojica is a 80 y o  male with history of advanced dementia, recent hospitalization from 11/18 to 11/21 for or acute kidney injury secondary to poor oral intake and diuretic use now readmitted on 11/24 for fevers with worsening confusion  Patient lives in a locked dementia unit at [facility]  Patient was not initially diagnosed with pneumonia and started on empiric vancomycin and cefepime on admission  Now blood cultures from admission are positive for GPCs in clusters, for which Infectious Disease was asked to evaluate the patient  Patient has advanced dementia and cannot provide a good history  He states that he thinks he is dying  Otherwise, expresses no complaints  Denies any pain  Denies any hardware in his body that he can recall  REVIEW OF SYSTEMS:    A complete system-based review of systems is otherwise negative      PAST MEDICAL HISTORY:  Past Medical History:   Diagnosis Date    [facility] 11/18/2017    Ambulatory dysfunction     Arthritis     CKD (chronic kidney disease[facility] stage 4, GFR 15-29 ml/min [facility])     CV[facility]ce[facility]     Dementia     Disease of thyroid gland     Elevated blood uric acid level     GERD (gastroesophageal reflux disease)     GI bleed     Gout 11/18/2017    HTN (hypertension), benign     Hyperlipidemia     Hyperlipidemia     Hypertension     Hypothyroid     Normocytic anemia     [facility]) [facility]     Pulmonary embolism ([facility])     Renal disorder     [facility]     Stroke [facility]     Vitamin D deficiency      Past Surgical History:   Procedure Laterality Date    NO PAST SURGERIES         FAMILY HISTORY:  Non-contributory    SOCIAL HISTORY:  History Alcohol Use    Yes     Comment: socially      History   Drug Use No     History   Smoking Status    Never Smoker   Smokeless Tobacco    Never Used       ALLERGIES:  Allergies   Allergen Reactions    Allopurinol     Aspirin GI Bleeding       MEDICATIONS:  All current active medications have been reviewed  PHYSICAL EXAM:  Vitals:  HR:  [] 79  Resp:  [16-18] 18  BP: (140-185)/(63-84) 185/82  SpO2:  [94 %-100 %] 99 %  Temp (24hrs), Av 2 °F (37 9 °C), Min:98 2 °F (36 8 °C), Max:101 3 °F (38 5 °C)  Current: Temperature: 99 5 °F (37 5 °C)     Physical Exam:  General:    No acute distress, lying comfortably in bed  Eyes:  Conjunctive clear with no hemorrhages or effusions  Oropharynx:  No ulcers, no lesions  Neck:  Supple, no lymphadenopathy  Lungs:  Clear to auscultation bilaterally, no accessory muscle use  Cardiac:  Regular rate and rhythm, no murmurs  Abdomen:  Soft, non-tender, non-distended  Extremities:   Mild lower extremity edema  Skin:  No rashes, no ulcers  Neurological:  Moves all four extremities spontaneously, sensation grossly intact      LABS, IMAGING, & OTHER STUDIES:  Lab Results:  I have personally reviewed pertinent labs  Results from last 7 days  Lab Units 17  1202 17  0443 17  0608  17  1847   SODIUM mmol/L 138 141 142  < > 149*   POTASSIUM mmol/L 3 9 4 0 3 6  < > 4 1   CHLORIDE mmol/L 104 107 107  < > 108   CO2 mmol/L 26 28 26  < > 32   ANION GAP mmol/L 8 6 9  < > 9   BUN mg/dL 34* 40* 47*  < > 61*   CREATININE mg/dL 2 14* 1 71* 1 84*  < > 2 93*   EGFR ml/min/1 73sq m 26 34 31  < > 17   GLUCOSE RANDOM mg/dL 134 91 85  < > 103   CALCIUM mg/dL 8 7 8 1* 8 2*  < > 9 1   AST U/L 31  --   --   --  21   ALT U/L 27  --   --   --  28   ALK PHOS U/L 64  --   --   --  67   TOTAL PROTEIN g/dL 7 0  --   --   --  7 4   ALBUMIN g/dL 3 2*  --   --   --  4 0   BILIRUBIN TOTAL mg/dL 0 83  --   --   --  0 75   < > = values in this interval not displayed      Results from last 7 days  Lab Units 11/24/17  1202 11/18/17  1847   WBC Thousand/uL 9 78 8 13   HEMOGLOBIN g/dL 10 9* 11 8*   PLATELETS Thousands/uL 167 253       Results from last 7 days  Lab Units 11/24/17  1203 11/24/17  1202   GRAM STAIN RESULT  Gram positive cocci in clusters Gram positive cocci in clusters         Imaging Studies:   I have personally reviewed pertinent imaging study reports and images in PACS  CT abdomen and pelvis without contrast showed no acute findings  Patchy consolidation in the right lower lobe posteriorly may represent atelectasis or infiltrate  CT head was negative    EKG, Pathology, and Other Studies:   I have personally reviewed pertinent reports

## 2017-11-25 NOTE — PHYSICAL THERAPY NOTE
PT Evaluation (23min)  (7:15-7:38)    Past Medical History:   Diagnosis Date    KULWINDER [facility]acute kidney injury) ([facility] ) 11/18/2017    Ambulatory dysfunction     Arthritis     CKD (chronic kidney disease) stage 4, GFR 15-29 ml/min ([facility])     CVA (cerebral vascular accident) ([facility] )     Dementia     Disease of thyroid gland     Elevated blood uric acid level     GERD (gastroesophageal reflux disease)     GI bleed     Gout 11/18/2017    HTN (hypertension), benign     Hyperlipidemia     Hyperlipidemia     Hypertension     Hypothyroid     Normocytic anemia     [facility]) [facility]     Pulmonary embolism ([facility])     Renal disorder     [place] hyperparathyroidism ([place] [id number] )     Stroke ([place] [address] )     Vitamin D deficiency         11/25/17 0738   Note Type   Note type Eval only   Pain Assessment   Pain Assessment No/denies pain   Home Living   Type of Home SNF  ([facility])   Home Layout One level   Home Equipment Cane   Prior Function   Level of [place] Needs assistance with ADLs and functional mobility  (reports ambulating c SPC)   Receives Help From Personal care attendant  (SNF staff (A) prn)   ADL Assistance Needs assistance   Falls in the last 6 months 0  (per pt)   Restrictions/Precautions   Other Precautions Contact/isolation;Cognitive; Bed Alarm;Multiple lines;Telemetry;O2;Fall Risk   General   Additional Pertinent History pt presents to [facility] c increased confusion  dx: PNA  recently admitted 11/18/17-11/22/17 2* KULWINDER + decreased p o  PT consulted for mobility + d/c planning      Family/Caregiver Present No   Cognition   Arousal/Participation Lethargic   Orientation Level Oriented to person   RUE Assessment   RUE Assessment WFL   LUE Assessment   LUE Assessment WFL   RLE Assessment   RLE Assessment WFL  (4-/5; tremulous)   LLE Assessment   LLE Assessment WFL  (4-/5; tremulous)   Coordination   Sensation WFL   Bed Mobility   Supine to Sit 4  Minimal assistance Additional items Assist x 1;HOB elevated; Increased time required;Verbal cues   Sit to Supine 4  Minimal assistance   Additional items Assist x 2;Verbal cues;LE management   Transfers   Sit to Stand 3  Moderate assistance   Additional items Assist x 1;Verbal cues; Increased time required   Stand to Sit 3  Moderate assistance   Additional items Assist x 1;Verbal cues   Ambulation/Elevation   Gait pattern Narrow JONA; Decreased foot clearance; Excessively slow; Step to  (tremulous)   Gait Assistance 3  Moderate assist   Additional items Assist x 1;Verbal cues   Assistive Device Rolling walker   Distance 5' laterally at EOB; limited by lethargy   Balance   Static Sitting Fair +   Dynamic Sitting Fair +   Static Standing Poor +   Dynamic Standing Poor   Ambulatory Poor   Activity Tolerance   Activity Tolerance Patient limited by fatigue   Nurse Made Aware [place]   Assessment   Prognosis Good   Problem List Decreased strength;Decreased endurance; Impaired balance;Decreased mobility; Decreased cognition;Decreased safety awareness   Assessment pt is a 89y/o m who presents to [facility] c PNA  off note, pt recently admitted 11/18-11/22/17 2* KULWINDER + decreased p o  PMH significant for dementia, vit D deficiency, PAD, GERD, CKD, + gout  pt oriented only to person + lethargic  reports being mod (I) c SPC for mobility at baseline + denies h/o falls  resides at [facility] dementia unit  currently requires min-mod (A)x1-2 for functional mobility tasks 2* deficits in strength, balance, gait quality + activity tolerance noted in PT exam above  Barthel Index 20/100  pt tremulous c initiation of movement + c OOB mobility  increased time required for processing + to complete all mobility tasks  ambulated 5' laterally at EOB c support of RW c mod verbal cues for sequencing + RW management  returned to supine at end of session 2* increased lethargy c bed alarm activated  encouraged OOB to chair for meals   would benefit from skilled PT to maximize functional mobility + improve quality of life  upon d/c, recommend STR if SNF unable to accept pt at currently mobility level  PT eval of high complexity 2* unstable med status c pt requiring ongoing medical management 2* PNA  pt c multiple co-morbidities including dementia impacting PT  presents c decline in mobility from baseline c noted deficits above increasing fall risk  also c multiple lines + increased lethargy  Barriers to Discharge None   Goals   Patient Goals none stated   STG Expiration Date 12/05/17   Short Term Goal #1 1  increase strength 1/2 grade, 2  perform bed mobility c (S), 3  safely perform transfers c (S), 4  ambulate 150' c (S) + RW s overt loss of balance   Plan   Treatment/Interventions Functional transfer training;LE strengthening/ROM; Therapeutic exercise; Endurance training;Patient/family training;Bed mobility;Gait training;Equipment eval/education;Spoke to nursing   PT Frequency 5x/wk   Recommendation   Recommendation Short-term skilled PT  (if facility unable to accept pt)   PT - OK to Discharge No   Barthel Index   Feeding 5   Bathing 0   Grooming Score 0   Dressing Score 5   Bladder Score 0   Bowels Score 0   Toilet Use Score 5   Transfers (Bed/Chair) Score 5   Mobility (Level Surface) Score 0   Stairs Score 0   Barthel Index Score 20     Sandra Hernandez, PT

## 2017-11-25 NOTE — H&P
History and Physical - [facility]    Patient Information: Randall Prasad 80 y o  male MRN: [id number]  [facility] Encounter: [id number]  Admitting Physician: Evelia Lo PA-C  PCP: Clarice Solis MD  Date of Admission:  11/24/17    Assessment/Plan:    Hospital Problem List:     Principal Problem:    Pneumonia  Active Problems:    GERD (gastroesophageal reflux disease)    Normocytic anemia    Hypothyroid    HTN (hypertension), benign    Dementia    Ambulatory dysfunction    Hyperlipidemia    Acute on chronic kidney disease    Plan for the Primary Problem(s):  · pneumonia  · Admit to med/surg  Treat with vanco/cefepime as patient has recent hospitalization and resides in nursing facility  Patient DNR/DNI per daughter  Plan for Additional Problems:   · Anemia- HGB stable at 10 9, will follow  · Hypothyroid- continue levothyroxine  · HTN- BP has been elevated  Hyzaar was held on recent admission last week due to KULWINDER, unclear at this time if it was resumed  Will order PRN metoprolol tonight for SBP>180  · Dementia- lives [facility] in locked dementia unit  Continue namzaric  · Ambulatory dysfunction- PT/OT consult  · Hyperlipidemia- on zocor at home, order pravachol  · Acute on chronic kidney disease- creat 2 14 which is elevated from 1 7 which is his baseline  Hold diuretics  Avoid nephrotoxic medications  Gentle IV fluids ordered  Recheck labs in AM    VTE Prophylaxis: Heparin  / sequential compression device   Code Status: DNR/DNI  POLST: There is no POLST form on file for this patient (pre-hospital)    Anticipated Length of Stay:  Patient will be admitted on an Inpatient basis with an anticipated length of stay of  Greater than 2 midnights  Justification for Hospital Stay: patient requires IV antibiotics    Total Time for Visit, including Counseling / Coordination of Care: 45 minutes    Greater than 50% of this total time spent on direct patient counseling and coordination of care  Chief Complaint:   fever    History of Present Illness:    Jamee Gale is a 80 y o  male who presents with fever and increased confusion  Patient lives in [facility] in locked dementia unit  He offers no complaints at this time  Staff reports he has also been more confused than usual  He was recently admitted here from 11/18 to 11/22 for KULWINDER due to decreased PO intake  Review of Systems:    Review of Systems   Unable to perform ROS: Dementia       Past Medical and Surgical History:     Past Medical History:   Diagnosis Date    [facility] 11/[facility]2017    Ambulatory dysfunction     Arthritis     CKD (chronic kidney disease) stage 4, GFR 15-29 ml/min ([facility])     [facility]cere[facility] vascular accident[facility]     Dementia     Disease of thyroid gland     Elevated blood uric acid level     GERD (gastroesophageal reflux disease)     GI bleed     Gout 11/18/2017    HTN (hypertension), benign     Hyperlipidemia     Hyperlipidemia     Hypertension     Hypothyroid     Normocytic anemia     [facility]AD [facility]peripheral artery disease) ([facility])     Pulmonary embolism (HCC)     Renal disorder     Secondary hyperparathyroidism ([facility] )     Stroke ([place] [address] )     Vitamin D deficiency        Past Surgical History:   Procedure Laterality Date    NO PAST SURGERIES         Meds/Allergies:    Prior to Admission medications    Medication Sig Start Date End Date Taking? Authorizing Provider   azithromycin (ZITHROMAX) 250 mg tablet Take 250 mg by mouth every 24 hours   Yes Historical Provider, MD   calcitriol (ROCALTROL) 0 25 mcg capsule Take 0 25 mcg by mouth every other day 3 times per week ( M/W/F)    Yes Historical Provider, MD   febuxostat (ULORIC) 40 mg tablet Take by mouth 2/16/17  Yes Historical Provider, MD   furosemide (LASIX) 20 mg tablet Take 1 tablet by mouth daily as needed (edema) 11/21/17  Yes Gardenia Sosa,    LEVOTHYROXINE SODIUM PO Take 0 125 mg by mouth daily     Yes Historical Provider, MD   loperamide (IMODIUM) 2 mg capsule Take 2 mg by mouth as needed for diarrhea   Yes Historical Provider, MD   Memantine HCl-Donepezil HCl (NAMZARIC) 28-10 MG CP24 Take 1 capsule by mouth   Yes Historical Provider, MD   nystatin (MYCOSTATIN) powder Apply topically 2 (two) times a day 11/21/17  Yes Gardenia Sosa,    simvastatin (ZOCOR) 20 mg tablet Take 20 mg by mouth daily at bedtime  Yes Historical Provider, MD     I have reveiwed home medications using records provided by [facility]  Allergies: Allergies   Allergen Reactions    Allopurinol     Aspirin GI Bleeding       Social History:     Marital Status:      Patient Pre-hospital Living Situation: lives in locked dementia unit    Substance Use History:   History   Alcohol Use    Yes     Comment: socially      History   Smoking Status    Never Smoker   Smokeless Tobacco    Never Used     History   Drug Use No       Family History:    non-contributory    Physical Exam:     Vitals:   Blood Pressure: (!) 175/84 (11/24/17 1551)  Pulse: (!) 116 (11/24/17 1551)  Temperature: (!) 100 9 °F (38 3 °C) (11/24/17 1551)  Temp Source: Tympanic (11/24/17 1551)  Respirations: 22 (11/24/17 1444)  Weight - Scale: 102 kg (225 lb 15 5 oz) (11/24/17 1408)  SpO2: 95 % (11/24/17 1551)    Physical Exam   Constitutional: He appears well-developed and well-nourished  No distress  HENT:   Head: Normocephalic and atraumatic  Eyes: Conjunctivae are normal  Pupils are equal, round, and reactive to light  Neck: Normal range of motion  Neck supple  No JVD present  No tracheal deviation present  No thyromegaly present  Cardiovascular: Normal rate and regular rhythm  Pulmonary/Chest: Effort normal  No respiratory distress  He has no wheezes  Abdominal: Soft  Bowel sounds are normal    Musculoskeletal: Normal range of motion  He exhibits no edema, tenderness or deformity  Lymphadenopathy:     He has no cervical adenopathy     Neurological: Awakens easily to name  Pleasantly confused at baseline   Skin: Skin is warm and dry  No rash noted  He is not diaphoretic  No erythema  No pallor  Psychiatric: He has a normal mood and affect  His behavior is normal    Vitals reviewed  Additional Data:     Lab Results: I have personally reviewed pertinent reports  Results from last 7 days  Lab Units 11/24/17  1202   WBC Thousand/uL 9 78   HEMOGLOBIN g/dL 10 9*   HEMATOCRIT % 32 2*   PLATELETS Thousands/uL 167   NEUTROS PCT % 88*   LYMPHS PCT % 4*   MONOS PCT % 8   EOS PCT % 0       Results from last 7 days  Lab Units 11/24/17  1202   SODIUM mmol/L 138   POTASSIUM mmol/L 3 9   CHLORIDE mmol/L 104   CO2 mmol/L 26   BUN mg/dL 34*   CREATININE mg/dL 2 14*   CALCIUM mg/dL 8 7   TOTAL PROTEIN g/dL 7 0   BILIRUBIN TOTAL mg/dL 0 83   ALK PHOS U/L 64   ALT U/L 27   AST U/L 31   GLUCOSE RANDOM mg/dL 134       Results from last 7 days  Lab Units 11/24/17  1202   INR  1 09       Imaging: I have personally reviewed pertinent reports  Ct Abdomen Pelvis Wo Contrast    Result Date: 11/24/2017  Narrative: CT ABDOMEN AND PELVIS WITHOUT IV CONTRAST INDICATION:  Abdominal pain  Lethargy, fever  COMPARISON: None  TECHNIQUE:  CT examination of the abdomen and pelvis was performed without intravenous contrast   Reformatted images were created in axial, sagittal, and coronal planes  Radiation dose length product (DLP) for this visit:  1106 mGy-cm   This examination, like all CT scans performed in the [facility], was performed utilizing techniques to minimize radiation dose exposure, including the use of iterative reconstruction and automated exposure control  Enteric contrast was not administered  FINDINGS: Overall exam is somewhat suboptimal due to motion  ABDOMEN LOWER CHEST:  There is a moderate-sized hiatal hernia  Patchy consolidation in the right lower lobe posteriorly may represent atelectasis or infiltrate    Minimal consolidation in the left lower lobe posteriorly  LIVER/BILIARY TREE:  Unremarkable  GALLBLADDER:  Several gallstones in the gallbladder  SPLEEN:  Unremarkable  PANCREAS:  Unremarkable  ADRENAL GLANDS:  Unremarkable  KIDNEYS/URETERS:  Bilateral perinephric stranding is nonspecific  No hydronephrosis  STOMACH AND BOWEL:  Limited evaluation without contrast   No bowel obstruction  Colonic diverticulosis without definite acute diverticulitis  APPENDIX:  No findings to suggest appendicitis  ABDOMINOPELVIC CAVITY:  No free air or free fluid  Partially calcified mesenteric nodule identified in the right hemiabdomen measuring 1 8 x 1 2 cm, image 54 series 2  VESSELS:  Unremarkable for patient's age  PELVIS REPRODUCTIVE ORGANS:  Prostate is enlarged  URINARY BLADDER:  Bladder diverticula noted bilaterally measuring up to 2 3 cm on the right  ABDOMINAL WALL/INGUINAL REGIONS:  Moderate-sized fat-containing left inguinal hernia  OSSEOUS STRUCTURES:  No acute fracture or destructive osseous lesion  Impression: Limited exam due to motion and without IV and oral contrast  1   No definite acute findings in the abdomen or pelvis  2    Colonic diverticulosis  Gallstones  3   Partially calcified mesenteric nodule in the right hemiabdomen, nonspecific but can be seen with entities such as carcinoid disease  Correlate clinically  4   Enlarged prostate  Bladder diverticula  5   Moderate-sized hiatal hernia  Patchy consolidation in the right lower lobe posteriorly may represent atelectasis or infiltrate  Minimal consolidation in the left lower lobe posteriorly  Workstation performed: [id number]     Xr Chest 1 View Portable    Result Date: 11/24/2017  Narrative: CHEST INDICATION:  Fever  Lethargy  Upper respiratory infection  COMPARISON:  11/18/2017 VIEWS:   AP frontal IMAGES:  1 FINDINGS:  There is slight elevation of the right hemidiaphragm  Cardiomediastinal silhouette appears unremarkable   And of platelike atelectasis noted in the right lung   Otherwise, lungs are clear  No pleural fluid or pneumothorax identified  Visualized osseous structures appear within normal limits for the patient's age  Impression: Minimal platelike atelectasis right lung Workstation performed: [id number]     Xr Chest 1 View Portable    Result Date: 11/19/2017  Narrative: CHEST  PORTABLE INDICATION: 80year-old male, altered mental status COMPARISON:  6/26/2016 chest x-ray VIEWS:   AP frontal; 1 image FINDINGS: The cardiomediastinal silhouette is unremarkable  The lungs are clear  No pleural effusion  Bony thorax unremarkable  Findings are stable     Impression: No acute cardiopulmonary disease, stable Findings are consistent with emergency room physician's preliminary reading Workstation performed: [id number]     Ct Head Without Contrast    Result Date: 11/18/2017  Narrative: CT BRAIN - WITHOUT CONTRAST INDICATION:  Altered mental status COMPARISON:  None  TECHNIQUE:  CT examination of the brain was performed  In addition to axial images, coronal reformatted images were created and submitted for interpretation  Radiation dose length product (DLP) for this visit:  1114 mGy-cm   This examination, like all CT scans performed in the [facility], was performed utilizing techniques to minimize radiation dose exposure, including the use of iterative reconstruction and automated exposure control  IMAGE QUALITY:  Diagnostic  FINDINGS:  PARENCHYMA:  Decreased attenuation is noted in the supratentorial white matter demonstrating an appearance most consistent with mild microangiopathic change  No intracranial mass, mass effect or midline shift  No CT signs of acute infarction  There is no parenchymal hemorrhage  VENTRICLES AND EXTRA-AXIAL SPACES:  Enlargement of ventricles and extra-axial CSF spaces consistent with cerebral and cerebellar atrophy  VISUALIZED ORBITS AND PARANASAL SINUSES:  Unremarkable   CALVARIUM AND EXTRACRANIAL SOFT TISSUES: Normal      Impression: No acute intracranial abnormality  Microangiopathic changes  Workstation performed: [id number]       EKG, Pathology, and Other Studies Reviewed on Admission:   · EKG: no ischemic changes    Allscripts / Epic Records Reviewed: Yes     ** Please Note: This note has been constructed using a voice recognition system   **

## 2017-11-25 NOTE — PLAN OF CARE
Problem: PHYSICAL THERAPY ADULT  Goal: Performs mobility at highest level of function for planned discharge setting  See evaluation for individualized goals  Treatment/Interventions: Functional transfer training, LE strengthening/ROM, Therapeutic exercise, Endurance training, Patient/family training, Bed mobility, Gait training, Equipment eval/education, Spoke to nursing          See flowsheet documentation for full assessment, interventions and recommendations  Prognosis: Good  Problem List: Decreased strength, Decreased endurance, Impaired balance, Decreased mobility, Decreased cognition, Decreased safety awareness  Assessment: pt is a 89y/o m who presents to [facility] c PNA  off note, pt recently admitted 11/18-11/22/17 2* KULWINDER + decreased p o  PMH significant for dementia, vit D deficiency, PAD, GERD, CKD, + gout  pt oriented only to person + lethargic  reports being mod (I) c SPC for mobility at baseline + denies h/o falls  resides at [address] [place] [address] dementia unit  currently requires min-mod (A)x1-2 for functional mobility tasks 2* deficits in strength, balance, gait quality + activity tolerance noted in PT exam above  Barthel Index 20/100  pt tremulous c initiation of movement + c OOB mobility  increased time required for processing + to complete all mobility tasks  ambulated 5' laterally at EOB c support of RW c mod verbal cues for sequencing + RW management  returned to supine at end of session 2* increased lethargy c bed alarm activated  encouraged OOB to chair for meals  would benefit from skilled PT to maximize functional mobility + improve quality of life  upon d/c, recommend STR if SNF unable to accept pt at currently mobility level  PT eval of high complexity 2* unstable med status c pt requiring ongoing medical management 2* PNA  pt c multiple co-morbidities including dementia impacting PT  presents c decline in mobility from baseline c noted deficits above increasing fall risk   also c multiple lines + increased lethargy  Barriers to Discharge: None     Recommendation: Short-term skilled PT (if facility unable to accept pt)     PT - OK to Discharge: No    See flowsheet documentation for full assessment

## 2017-11-25 NOTE — CASE MANAGEMENT
Initial Clinical Review    Admission: Date/Time/Statement: 11/24/17 @ 1505     Orders Placed This Encounter   Procedures    Inpatient Admission (expected length of stay for this patient is greater than two midnights)     Standing Status:   Standing     Number of Occurrences:   1     Order Specific Question:   Admitting Physician     Answer:   Nathan Bach [[id number]]     Order Specific Question:   Level of Care     Answer:   Med Surg [[id number]]     Order Specific Question:   Estimated length of stay     Answer:   More than 2 Midnights     Order Specific Question:   Certification     Answer:   I certify that inpatient services are medically necessary for this patient for a duration of greater than two midnights  See H&P and MD Progress Notes for additional information about the patient's course of treatment  ED: Date/Time/Mode of Arrival:   ED Arrival Information     Expected Arrival Acuity Means of Arrival Escorted By Service Admission Type    - 11/24/2017 11:30 Urgent Ambulance [address] Urgent    Arrival Complaint    SLOW RESPOND          Chief Complaint:   Chief Complaint   Patient presents with    Fever - 9 weeks to 74 years     per EMS ([facility]) pt comes from [facility] after being discharged with URI started on "z-pack" staff at [facility] reports increased lethargy and fever today  per EMS pt is on a locked dementia unit but staff reported increased confusion and lethargy       History of Illness:    Lacy Gomez is a 80 y o  male who presents with fever and increased confusion  Patient lives in [facility] in locked dementia unit  He offers no complaints at this time   Staff reports he has also been more confused than usual  He was recently admitted here from 11/18 to 11/22 for KULWINDER due to decreased PO intake         ED Vital Signs:   ED Triage Vitals   Temperature Pulse Respirations Blood Pressure SpO2   11/24/17 1132 11/24/17 1132 11/24/17 1132 11/24/17 1132 11/24/17 1132   (!) 101 4 °F (38 6 °C) 88 18 (!) 174/76 95 %      Temp Source Heart Rate Source Patient Position - Orthostatic VS BP Location FiO2 (%)   11/24/17 1132 11/24/17 1132 11/24/17 1132 11/24/17 1132 --   Rectal Monitor Lying Left arm       Pain Score       11/24/17 2003       No Pain        Wt Readings from Last 1 Encounters:   11/24/17 102 kg (225 lb 15 5 oz)       Vital Signs (abnormal):    above    Abnormal Labs/Diagnostic Test Results:   BUN/Creat    34/2 14  Albumin    3 2  H/H   10 9/32 2  Ct  Abd:   No definite acute findings in the abdomen or pelvis  2    Colonic diverticulosis   Gallstones  3   Partially calcified mesenteric nodule in the right hemiabdomen, nonspecific but can be seen with entities such as carcinoid disease   Correlate clinically     4   Enlarged prostate   Bladder diverticula  5   Moderate-sized hiatal hernia   Patchy consolidation in the right lower lobe posteriorly may represent atelectasis or infiltrate   Minimal consolidation in the left lower lobe posteriorly  CXR:    Minimal platelike atelectasis right lung    ED Treatment:   Medication Administration from 11/24/2017 1129 to 11/24/2017 1544       Date/Time Order Dose Route Action Action by Comments     11/24/2017 1202 acetaminophen (TYLENOL) rectal suppository 650 mg 650 mg Rectal Given Hui De La Paz RN      11/24/2017 1408 sodium chloride 0 9 % bolus 1,000 mL 1,000 mL Intravenous [address] Hui De La Paz RN      11/24/2017 1412 cefepime (MAXIPIME) 2 g/50 mL dextrose IVPB 2,000 mg Intravenous New [address] Hui De La Paz RN           Past Medical/Surgical History:    Active Ambulatory Problems     Diagnosis Date Noted    Vitamin D deficiency     [facility]par[facility] )     [facility]AD (peripheral artery disease) ([facility])     GERD (gastroesophageal reflux disease)     CKD (chronic kidney disease) stage 4, GFR 15-29 ml/min ([facility])     Normocytic anemia     Hypothyroid     HTN (hypertension), benign     Dementia     Ambulatory dysfunction     Hyperlipidemia     Gout 11/18/2017     Resolved Ambulatory Problems     Diagnosis Date Noted    Elevated blood uric acid level     Cellulitis of left hand 06/26/2016    [facility]ER [facility] ) 11/18/2017     Past Medical History:   Diagnosis Date    KULWINDER (acute kidney injury) ([facility] ) 11/18/2017    Ambulatory dysfunction     Arthritis     CKD (chronic kidney disease) stage 4, GFR 15-29 ml/min ([facility])     CVA (cerebral vascular accident) ([place] [address] )     Dementia     Disease of thyroid gland     Elevated blood uric acid level     GERD (gastroesophageal reflux disease)     GI bleed     Gout 11/18/2017    HTN (hypertension), benign     Hyperlipidemia     Hyperlipidemia     Hypertension     Hypothyroid     Normocytic anemia     PAD (peripheral artery disease) ([facility])     Pulmonary embolism ([facility])     Renal disorder     Secondary hyperparathyroidism ([place] [address] )     Stroke ([place] [address] )     Vitamin D deficiency        Admitting Diagnosis: Pneumonia [J18 9]  Fever [R50 9]  Acute kidney injury ([facility] ) [N17 9]    Age/Sex: 80 y o  male    · Assessment/Plan:    pneumonia  ? Admit to med/surg  Treat with vanco/cefepime as patient has recent hospitalization and resides in nursing facility  Patient DNR/DNI per daughter       Plan for Additional Problems:   · Anemia- HGB stable at 10 9, will follow  · Hypothyroid- continue levothyroxine  · HTN- BP has been elevated  Hyzaar was held on recent admission last week due to KULWINDER, unclear at this time if it was resumed  Will order PRN metoprolol tonight for SBP>180  · Dementia- lives at [facility] in locked dementia unit  Continue namzaric  · Ambulatory dysfunction- PT/OT consult  · Hyperlipidemia- on zocor at home, order pravachol  · Acute on chronic kidney disease- creat 2 14 which is elevated from 1 7 which is his baseline  Hold diuretics  Avoid nephrotoxic medications  Gentle IV fluids ordered   Recheck labs in AM     VTE Prophylaxis: Heparin  / sequential compression device   Code Status: DNR/DNI  POLST: There is no POLST form on file for this patient (pre-hospital)     Anticipated Length of Stay:  Patient will be admitted on an Inpatient basis with an anticipated length of stay of  Greater than 2 midnights     Justification for Hospital Stay: patient requires IV antibiotics    Admission Orders:    IP    11/24  @     1505  Scheduled Meds:   amLODIPine 5 mg Oral Daily   calcitriol 0 25 mcg Oral Every Other Day   cefTRIAXone 1,000 mg Intravenous Q24H   memantine 10 mg Oral BID   And      donepezil 5 mg Oral BID   heparin (porcine) 5,000 Units Subcutaneous Q8H [address]   levothyroxine 125 mcg Oral Daily   nystatin  Topical BID   pravastatin 40 mg Oral Daily With Dinner   vancomycin 1,000 mg Intravenous Q24H     Continuous Infusions:   sodium chloride 100 mL/hr Last Rate: 100 mL/hr (11/25/17 0740)     PRN Meds:   acetaminophen    aluminum-magnesium hydroxide-simethicone    loperamide    metoprolol    ondansetron     Reg diet  PT/OT  Tele  Contact isolation  Cons  ID      PRIOR  ADM:    Discharge Summary - [place] [address] Internal Medicine     Patient Information: Randall Prasad 80 y o  male MRN: [id number]  Unit/Bed#: E4 -01 Encounter: [id number]     Discharging Physician / Practitioner: Cristian Peacock DO  PCP: Clarice Solis MD  Admission Date: 11/18/2017  Discharge Date: 11/21/17     Reason for Admission: kulwinder and hypernatremia     Discharge Diagnoses:      Principal Problem (Resolved):    [facility] )  Active Problems:    CKD (chronic kidney disease) stage 4, GFR 15-29 ml/min ([facility])    Normocytic anemia    Hypothyroid    HTN (hypertension), benign    Dementia    Ambulatory dysfunction    Hyperlipidemia    Gout        Consultations During Hospital Stay:  · none     Procedures Performed:      · none     Incidental Findings:   · none     Test Results Pending at Discharge (will require follow up):   · none     Outpatient Tests Requested:  none     Hospital Course:      Enrique Van is a 80 y o  male patient who originally presented to the hospital on 11/18/2017 due to acute renal failure and hypernatremia which was due to poor po intake and diuretics  He has dementia  This is likely caused him not to eat and caused him to have an acute encephalopathy from the dehydration  His nephrotoxic medications were held  He was started on IVF  His renal failure resolved  He does have ckd3  His baseline is 1 7  His hypernatremia resolved with IVf  He was eating and drinking well at discharge  He did have alow-grade fever which is likely viral  His ua negative   His x-ray chest is normal   The patient is not having any cough   Patient had a flu shot this year  He was not treated with antibiotics  [facility]       Discharge Day Visit / Exam:      * Please refer to separate progress note for these details *     Discharge instructions/Information to patient and family:   See after visit summary for information provided to patient and family        Provisions for Follow-Up Care:  See after visit summary for information related to follow-up care and any pertinent home health orders        Discharge Statement:  I spent 32  minutes discharging the patient  This time was spent on the day of discharge  I had direct contact with the patient on the day of discharge  Greater than 50% of the total time was spent examining patient, answering all patient questions, arranging and discussing plan of care with patient as well as directly providing post-discharge instructions    Additional time then spent on discharge activities      Discharge Medications:  See after visit summary for reconciled discharge medications provided to patient and family

## 2017-11-26 PROBLEM — A41.01 STAPHYLOCOCCUS AUREUS SEPTICEMIA (HCC): Status: ACTIVE | Noted: 2017-11-25

## 2017-11-26 PROBLEM — J18.9 PNEUMONIA: Status: RESOLVED | Noted: 2017-11-24 | Resolved: 2017-11-26

## 2017-11-26 PROBLEM — N17.9 ACUTE RENAL FAILURE SUPERIMPOSED ON STAGE 3 CHRONIC KIDNEY DISEASE (HCC): Status: ACTIVE | Noted: 2017-11-26

## 2017-11-26 PROBLEM — N18.30 ACUTE RENAL FAILURE SUPERIMPOSED ON STAGE 3 CHRONIC KIDNEY DISEASE (HCC): Status: ACTIVE | Noted: 2017-11-26

## 2017-11-26 LAB
ALBUMIN SERPL BCP-MCNC: 2.3 G/DL (ref 3.5–5)
ALP SERPL-CCNC: 52 U/L (ref 46–116)
ALT SERPL W P-5'-P-CCNC: 26 U/L (ref 12–78)
ANION GAP SERPL CALCULATED.3IONS-SCNC: 9 MMOL/L (ref 4–13)
AST SERPL W P-5'-P-CCNC: 30 U/L (ref 5–45)
BILIRUB SERPL-MCNC: 0.49 MG/DL (ref 0.2–1)
BUN SERPL-MCNC: 31 MG/DL (ref 5–25)
CALCIUM SERPL-MCNC: 8.1 MG/DL (ref 8.3–10.1)
CHLORIDE SERPL-SCNC: 111 MMOL/L (ref 100–108)
CO2 SERPL-SCNC: 22 MMOL/L (ref 21–32)
CREAT SERPL-MCNC: 1.58 MG/DL (ref 0.6–1.3)
ERYTHROCYTE [DISTWIDTH] IN BLOOD BY AUTOMATED COUNT: 13.6 % (ref 11.6–15.1)
GFR SERPL CREATININE-BSD FRML MDRD: 37 ML/MIN/1.73SQ M
GLUCOSE SERPL-MCNC: 105 MG/DL (ref 65–140)
HCT VFR BLD AUTO: 28.8 % (ref 36.5–49.3)
HGB BLD-MCNC: 9.7 G/DL (ref 12–17)
MCH RBC QN AUTO: 31.5 PG (ref 26.8–34.3)
MCHC RBC AUTO-ENTMCNC: 33.7 G/DL (ref 31.4–37.4)
MCV RBC AUTO: 94 FL (ref 82–98)
MRSA NOSE QL CULT: NORMAL
PLATELET # BLD AUTO: 159 THOUSANDS/UL (ref 149–390)
PMV BLD AUTO: 10.8 FL (ref 8.9–12.7)
POTASSIUM SERPL-SCNC: 3.7 MMOL/L (ref 3.5–5.3)
PROT SERPL-MCNC: 5.7 G/DL (ref 6.4–8.2)
RBC # BLD AUTO: 3.08 MILLION/UL (ref 3.88–5.62)
SODIUM SERPL-SCNC: 142 MMOL/L (ref 136–145)
WBC # BLD AUTO: 6.52 THOUSAND/UL (ref 4.31–10.16)

## 2017-11-26 PROCEDURE — 85027 COMPLETE CBC AUTOMATED: CPT | Performed by: INTERNAL MEDICINE

## 2017-11-26 PROCEDURE — 80053 COMPREHEN METABOLIC PANEL: CPT | Performed by: INTERNAL MEDICINE

## 2017-11-26 RX ORDER — AMLODIPINE BESYLATE 10 MG/1
10 TABLET ORAL DAILY
Status: DISCONTINUED | OUTPATIENT
Start: 2017-11-27 | End: 2017-12-02 | Stop reason: HOSPADM

## 2017-11-26 RX ORDER — AMLODIPINE BESYLATE 5 MG/1
5 TABLET ORAL ONCE
Status: COMPLETED | OUTPATIENT
Start: 2017-11-26 | End: 2017-11-26

## 2017-11-26 RX ADMIN — CALCITRIOL 0.25 MCG: 0.25 CAPSULE, LIQUID FILLED ORAL at 09:54

## 2017-11-26 RX ADMIN — HEPARIN SODIUM 5000 UNITS: 5000 INJECTION, SOLUTION INTRAVENOUS; SUBCUTANEOUS at 15:01

## 2017-11-26 RX ADMIN — SODIUM CHLORIDE 50 ML/HR: 0.9 INJECTION, SOLUTION INTRAVENOUS at 18:00

## 2017-11-26 RX ADMIN — AMLODIPINE BESYLATE 5 MG: 5 TABLET ORAL at 15:01

## 2017-11-26 RX ADMIN — PRAVASTATIN SODIUM 40 MG: 40 TABLET ORAL at 16:32

## 2017-11-26 RX ADMIN — MEMANTINE 10 MG: 5 TABLET ORAL at 09:53

## 2017-11-26 RX ADMIN — MEMANTINE 10 MG: 5 TABLET ORAL at 17:38

## 2017-11-26 RX ADMIN — NYSTATIN: 100000 POWDER TOPICAL at 17:38

## 2017-11-26 RX ADMIN — HEPARIN SODIUM 5000 UNITS: 5000 INJECTION, SOLUTION INTRAVENOUS; SUBCUTANEOUS at 21:47

## 2017-11-26 RX ADMIN — VANCOMYCIN HYDROCHLORIDE 1000 MG: 1 INJECTION, SOLUTION INTRAVENOUS at 16:27

## 2017-11-26 RX ADMIN — LEVOTHYROXINE SODIUM 125 MCG: 125 TABLET ORAL at 05:31

## 2017-11-26 RX ADMIN — DONEPEZIL HYDROCHLORIDE 5 MG: 5 TABLET, FILM COATED ORAL at 17:38

## 2017-11-26 RX ADMIN — NYSTATIN: 100000 POWDER TOPICAL at 09:54

## 2017-11-26 RX ADMIN — AMLODIPINE BESYLATE 5 MG: 5 TABLET ORAL at 09:53

## 2017-11-26 RX ADMIN — DONEPEZIL HYDROCHLORIDE 5 MG: 5 TABLET, FILM COATED ORAL at 09:54

## 2017-11-26 RX ADMIN — HEPARIN SODIUM 5000 UNITS: 5000 INJECTION, SOLUTION INTRAVENOUS; SUBCUTANEOUS at 05:31

## 2017-11-26 NOTE — PROGRESS NOTES
[facility] Progress Note  Patient: Leslie Jimenez 80 y o  male   MRN: [id number]  PCP: Kota Milian MD  Unit/Bed#: [address] [place] [address] [id number] Encounter: [id number]  Date Of Visit: 11/26/17    Assessment  Principal Problem:    Staphylococcus aureus septicemia ([facility] )  Active Problems:    GERD (gastroesophageal reflux disease)    Normocytic anemia    Hypothyroid    HTN (hypertension), benign    Dementia    Ambulatory dysfunction    Hyperlipidemia    Acute renal failure superimposed on stage 3 chronic kidney disease ([facility] )  Resolved Problems:    Pneumonia        Plan  1  Staph aureus sepsis  Unclear source  Check echocardiogram   Vancomycin per Infectious Diseases  Possible pneumonia ruled out as patient without respiratory symptoms  Ceftriaxone discontinued  2  Essential hypertension  Off losartan hydrochlorothiazide secondary to kidney disease  Will increase amlodipine  3  Acute kidney injury on chronic kidney disease stage 3 secondary to sepsis  At baseline now  Continue gentle fluids until patient's appetite and oral intake improved  4  Dementia  Stable donepezil and memantine  5  Hypothyroidism  Levothyroxine  6  Hyperlipidemia  ?utility of pravastatin in this elderly gentleman    VTE Pharmacologic Prophylaxis: Heparin    Patient Centered Rounds: I have performed bedside rounds with nursing staff today  Discussions with Specialists or Other Care Team Provider:  Discussed with patient's daughter    Education and Discussions with Family / Patient:     Time Spent for Care: 35 mins  More than 50% of total time spent on counseling and coordination of care as described above      Current Length of Stay: 2 day(s)    Current Patient Status: Inpatient   Certification Statement: The patient will continue to require additional inpatient hospital stay due to staph aureus sepsis    Discharge Plan / Estimated Discharge Date:  Undetermined at this time    Code Status: Level 3 - DNAR and DNI  ______________________________________________________________________________    Subjective:   Patient seen and examined  No new complaints  Afebrile  Objective:   Vitals: Blood pressure (!) 183/77, pulse 81, temperature 98 1 °F (36 7 °C), temperature source Tympanic, resp  rate 18, weight 102 kg (225 lb 15 5 oz), SpO2 96 %  Physical Exam:   General appearance: appears stated age, cooperative and no distress  Head: Normocephalic, without obvious abnormality, atraumatic  Lungs: diminished breath sounds bibasilar  Heart: regular rate and rhythm  Abdomen: Soft nontender positive bowel sounds  Back: negative, range of motion normal  Extremities: edema +1 lower extremities bilaterally  Neurologic:  Pleasantly demented    Additional Data:   Labs:    Results from last 7 days  Lab Units 11/26/17  0537 11/24/17  1202   WBC Thousand/uL 6 52 9 78   HEMOGLOBIN g/dL 9 7* 10 9*   HEMATOCRIT % 28 8* 32 2*   MCV fL 94 94   PLATELETS Thousands/uL 159 167   INR   --  1 09       Results from last 7 days  Lab Units 11/26/17  0537 11/24/17  1202 11/21/17  0443 11/20/17  0608   SODIUM mmol/L 142 138 141 142   POTASSIUM mmol/L 3 7 3 9 4 0 3 6   CHLORIDE mmol/L 111* 104 107 107   CO2 mmol/L 22 26 28 26   ANION GAP mmol/L 9 8 6 9   BUN mg/dL 31* 34* 40* 47*   CREATININE mg/dL 1 58* 2 14* 1 71* 1 84*   CALCIUM mg/dL 8 1* 8 7 8 1* 8 2*   ALBUMIN g/dL 2 3* 3 2*  --   --    BILIRUBIN TOTAL mg/dL 0 49 0 83  --   --    ALK PHOS U/L 52 64  --   --    ALT U/L 26 27  --   --    AST U/L 30 31  --   --    EGFR ml/min/1 73sq m 37 26 34 31   GLUCOSE RANDOM mg/dL 105 134 91 85                    Results from last 7 days  Lab Units 11/24/17  1202   LACTIC ACID mmol/L 2 0       Results from last 7 days  Lab Units 11/25/17  0213   POC GLUCOSE mg/dl 86             * I Have Reviewed All Lab Data Listed Above      Cultures:     Results from last 7 days  Lab Units 11/24/17  1203 11/24/17  1202   BLOOD CULTURE    Staphylococcus aureus* Staphylococcus aureus*   GRAM STAIN RESULT  Gram positive cocci in clusters Gram positive cocci in clusters         Imaging:  Imaging Reports Reviewed Today Include:       Scheduled Meds:  amLODIPine 5 mg Oral Daily   calcitriol 0 25 mcg Oral Every Other Day   memantine 10 mg Oral BID   And      donepezil 5 mg Oral BID   heparin (porcine) 5,000 Units Subcutaneous Q8H [address]   levothyroxine 125 mcg Oral Daily   nystatin  Topical BID   pravastatin 40 mg Oral Daily With Dinner   vancomycin 1,000 mg Intravenous Q24H     Continuous Infusions:  sodium chloride 50 mL/hr Last Rate: 50 mL/hr (11/25/17 2223)     PRN Meds:    acetaminophen    aluminum-magnesium hydroxide-simethicone    loperamide    metoprolol    ondansetron     Ole Adriana, DO

## 2017-11-26 NOTE — PROGRESS NOTES
Progress Note - Infectious Disease   Jamee Light 80 y o  male MRN: [id number]  Unit/Bed#: [address] 2 -02 Encounter: [id number]      Impression/Recommendations:  1  Sepsis, POA  Fevers, tachycardia  Secondary to #2  Tachycardia improving  Fevers trending down as well  Otherwise, patient is nontoxic appearing and hemodynamically stable      -  Antibiotics as below  - monitor WBC count and temperatures closely  - monitor hemodynamics  - follow-up blood culture results     2    Staph aureus bacteremia  Unclear source at this point  Patient did have a recent hospitalization for KULWINDER and received IV fluids, but no other recent interventions  CT abdomen and pelvis negative for any acute disease  Showed a patchy consolidation in the right lower lobe which is more likely atelectasis as patient not having any respiratory symptoms  Reviewed patient's medical history and do not see any history of hardware in his body, and he denies any as well  No skin lesions or wounds  He may have endocarditis      - continue with vancomycin  - check vancomycin trough prior to 4th dose  - follow-up sensitivities of blood cultures drawn on admission 11/24  -  Follow-up repeat blood culture sent 11/25 to ensure clearance  -  Check echocardiogram     3  KULWINDER on CKD  Creatinine elevated at 2 1  In the setting of poor p o  Intake and #2  Creatinine now improving      - fluid management per primary team  - monitor creatinine  -  Adjust vancomycin dose based on renal function     4  Right lower lobe opacity  I suspect that this is more likely atelectasis rather than pneumonia  The patient did not present with any significant respiratory symptoms and O2 saturations are stable  -  Monitor respiratory status closely  - Monitor for any new respiratory symptoms     4  Advanced dementia  Patient lives in a locked dementia unit      5  History of gout   No clinical signs to suggest acute gout on physical exam   -  Serial exams        Antibiotics:  Vancomycin D3       Subjective:  Patient denies fevers or chills  Laying comfortably in bed  States that he is at home  Is somnolent but arousable  Objective:  Vitals:  HR:  [] 81  Resp:  [18] 18  BP: (153-185)/(67-82) 183/77  SpO2:  [96 %-99 %] 96 %  Temp (24hrs), Av °F (37 2 °C), Min:98 1 °F (36 7 °C), Max:99 5 °F (37 5 °C)  Current: Temperature: 98 1 °F (36 7 °C)    Physical Exam:   General:  No acute distress  Psychiatric:  Awake and alert  Pulmonary:  Normal respiratory excursion without accessory muscle use  Abdomen:  Soft, nontender  Extremities:    Mild lower extremity edema  Skin:  No rashes   no stigmata of endocarditis    Lab Results:  I have personally reviewed pertinent labs  Results from last 7 days  Lab Units 17  0537 17  1202 17  0443   SODIUM mmol/L 142 138 141   POTASSIUM mmol/L 3 7 3 9 4 0   CHLORIDE mmol/L 111* 104 107   CO2 mmol/L 22 26 28   ANION GAP mmol/L 9 8 6   BUN mg/dL 31* 34* 40*   CREATININE mg/dL 1 58* 2 14* 1 71*   EGFR ml/min/1 73sq m 37 26 34   GLUCOSE RANDOM mg/dL 105 134 91   CALCIUM mg/dL 8 1* 8 7 8 1*   AST U/L 30 31  --    ALT U/L 26 27  --    ALK PHOS U/L 52 64  --    TOTAL PROTEIN g/dL 5 7* 7 0  --    ALBUMIN g/dL 2 3* 3 2*  --    BILIRUBIN TOTAL mg/dL 0 49 0 83  --        Results from last 7 days  Lab Units 17  0537 17  1202   WBC Thousand/uL 6 52 9 78   HEMOGLOBIN g/dL 9 7* 10 9*   PLATELETS Thousands/uL 159 167       Results from last 7 days  Lab Units 178 17  1203 17  1202   BLOOD CULTURE   --    Staphylococcus aureus*   Staphylococcus aureus*   GRAM STAIN RESULT   --  Gram positive cocci in clusters Gram positive cocci in clusters   MRSA CULTURE ONLY  No Methicillin Resistant Staphlyococcus aureus (MRSA) isolated  --   --        Imaging Studies:   I have personally reviewed pertinent imaging study reports and images in PACS      EKG, Pathology, and Other Studies:   I have personally reviewed pertinent reports

## 2017-11-26 NOTE — PHYSICIAN ADVISOR
This patient is a 80 y o  y/o male who is admitted to the hospital for Fever - 9 weeks to 74 years (per EMS ([place]) pt comes from [facility] after being discharged with URI started on "z-pack" staff at [facility] reports increased lethargy and fever today  per EMS pt is on a locked dementia unit but staff reported increased confusion and lethargy)   The patient presented to the ED on 11/24/17 at 1130 and was admitted to the hospital on 11/24/2017 1133  History of Present Illness includes: The patient had a prior admission from November 18 to November 21 secondary to acute renal failure  The patient also had hypernatremia  He was treated and discharged in stable condition  The patient presented on this admission with favor and increased confusion  The patient lives in a locked dementia unit  Vital signs in the ER are as follows   ED Triage Vitals   Temperature Pulse Respirations Blood Pressure SpO2   11/24/17 1132 11/24/17 1132 11/24/17 1132 11/24/17 1132 11/24/17 1132   (!) 101 4 °F (38 6 °C) 88 18 (!) 174/76 95 %      Temp Source Heart Rate Source Patient Position - Orthostatic VS BP Location FiO2 (%)   11/24/17 1132 11/24/17 1132 11/24/17 1132 11/24/17 1132 --   Rectal Monitor Lying Left arm       Pain Score       11/24/17 2003       No Pain         On exam, the lungs are clear the patient awakens easily to name is pleasantly confused the baseline  There is no edema  Hemoglobin is 10 9 and creatinine is 2 1  This patient is being admitted with pneumonia  The patient had a recent hospitalization and lives in a nursing home  The plan of care includes intravenous antibiotics, PRN metoprolol for accelerated hypertension, IV fluids for acute kidney injury on chronic kidney disease stage III and recheck labs  This patient is appropriate for inpatient admission as his length of stay is expected to be a least two midnights   Continued hospitalization is necessary to ensure stabilization of his clinical status   This admission is related to his prior admission as he was readmitted within 72 hours

## 2017-11-27 ENCOUNTER — APPOINTMENT (INPATIENT)
Dept: NON INVASIVE DIAGNOSTICS | Facility: HOSPITAL | Age: 82
DRG: 872 | End: 2017-11-27
Payer: MEDICARE

## 2017-11-27 LAB
ALBUMIN SERPL BCP-MCNC: 2.6 G/DL (ref 3.5–5)
ALP SERPL-CCNC: 57 U/L (ref 46–116)
ALT SERPL W P-5'-P-CCNC: 27 U/L (ref 12–78)
ANION GAP SERPL CALCULATED.3IONS-SCNC: 12 MMOL/L (ref 4–13)
AST SERPL W P-5'-P-CCNC: 26 U/L (ref 5–45)
BACTERIA BLD CULT: ABNORMAL
BILIRUB SERPL-MCNC: 0.4 MG/DL (ref 0.2–1)
BUN SERPL-MCNC: 28 MG/DL (ref 5–25)
CALCIUM SERPL-MCNC: 8.2 MG/DL (ref 8.3–10.1)
CHLORIDE SERPL-SCNC: 108 MMOL/L (ref 100–108)
CO2 SERPL-SCNC: 23 MMOL/L (ref 21–32)
CREAT SERPL-MCNC: 1.66 MG/DL (ref 0.6–1.3)
ERYTHROCYTE [DISTWIDTH] IN BLOOD BY AUTOMATED COUNT: 13.5 % (ref 11.6–15.1)
GFR SERPL CREATININE-BSD FRML MDRD: 35 ML/MIN/1.73SQ M
GLUCOSE SERPL-MCNC: 112 MG/DL (ref 65–140)
GRAM STN SPEC: ABNORMAL
HCT VFR BLD AUTO: 31 % (ref 36.5–49.3)
HGB BLD-MCNC: 10.5 G/DL (ref 12–17)
MCH RBC QN AUTO: 31.4 PG (ref 26.8–34.3)
MCHC RBC AUTO-ENTMCNC: 33.9 G/DL (ref 31.4–37.4)
MCV RBC AUTO: 93 FL (ref 82–98)
PLATELET # BLD AUTO: 226 THOUSANDS/UL (ref 149–390)
PMV BLD AUTO: 10.9 FL (ref 8.9–12.7)
POTASSIUM SERPL-SCNC: 3.7 MMOL/L (ref 3.5–5.3)
PROT SERPL-MCNC: 6.3 G/DL (ref 6.4–8.2)
RBC # BLD AUTO: 3.34 MILLION/UL (ref 3.88–5.62)
SODIUM SERPL-SCNC: 143 MMOL/L (ref 136–145)
VANCOMYCIN TROUGH SERPL-MCNC: 13.4 UG/ML (ref 10–20)
WBC # BLD AUTO: 6.29 THOUSAND/UL (ref 4.31–10.16)

## 2017-11-27 PROCEDURE — 85027 COMPLETE CBC AUTOMATED: CPT | Performed by: INTERNAL MEDICINE

## 2017-11-27 PROCEDURE — G8987 SELF CARE CURRENT STATUS: HCPCS

## 2017-11-27 PROCEDURE — 97116 GAIT TRAINING THERAPY: CPT

## 2017-11-27 PROCEDURE — G8988 SELF CARE GOAL STATUS: HCPCS

## 2017-11-27 PROCEDURE — 97110 THERAPEUTIC EXERCISES: CPT

## 2017-11-27 PROCEDURE — 97167 OT EVAL HIGH COMPLEX 60 MIN: CPT

## 2017-11-27 PROCEDURE — 93306 TTE W/DOPPLER COMPLETE: CPT

## 2017-11-27 PROCEDURE — 80202 ASSAY OF VANCOMYCIN: CPT | Performed by: INTERNAL MEDICINE

## 2017-11-27 PROCEDURE — 80053 COMPREHEN METABOLIC PANEL: CPT | Performed by: INTERNAL MEDICINE

## 2017-11-27 RX ADMIN — VANCOMYCIN HYDROCHLORIDE 1000 MG: 1 INJECTION, SOLUTION INTRAVENOUS at 17:00

## 2017-11-27 RX ADMIN — DONEPEZIL HYDROCHLORIDE 5 MG: 5 TABLET, FILM COATED ORAL at 17:55

## 2017-11-27 RX ADMIN — PRAVASTATIN SODIUM 40 MG: 40 TABLET ORAL at 17:57

## 2017-11-27 RX ADMIN — SODIUM CHLORIDE 50 ML/HR: 0.9 INJECTION, SOLUTION INTRAVENOUS at 13:53

## 2017-11-27 RX ADMIN — HEPARIN SODIUM 5000 UNITS: 5000 INJECTION, SOLUTION INTRAVENOUS; SUBCUTANEOUS at 23:00

## 2017-11-27 RX ADMIN — MEMANTINE 10 MG: 5 TABLET ORAL at 17:55

## 2017-11-27 RX ADMIN — AMLODIPINE BESYLATE 10 MG: 10 TABLET ORAL at 09:05

## 2017-11-27 RX ADMIN — NYSTATIN 1 APPLICATION: 100000 POWDER TOPICAL at 17:55

## 2017-11-27 RX ADMIN — DONEPEZIL HYDROCHLORIDE 5 MG: 5 TABLET, FILM COATED ORAL at 09:05

## 2017-11-27 RX ADMIN — MEMANTINE 10 MG: 5 TABLET ORAL at 09:05

## 2017-11-27 RX ADMIN — HEPARIN SODIUM 5000 UNITS: 5000 INJECTION, SOLUTION INTRAVENOUS; SUBCUTANEOUS at 05:49

## 2017-11-27 RX ADMIN — NYSTATIN 1 APPLICATION: 100000 POWDER TOPICAL at 09:44

## 2017-11-27 RX ADMIN — HEPARIN SODIUM 5000 UNITS: 5000 INJECTION, SOLUTION INTRAVENOUS; SUBCUTANEOUS at 13:53

## 2017-11-27 RX ADMIN — LEVOTHYROXINE SODIUM 125 MCG: 125 TABLET ORAL at 05:49

## 2017-11-27 NOTE — PLAN OF CARE
Problem: OCCUPATIONAL THERAPY ADULT  Goal: Performs self-care activities at highest level of function for planned discharge setting  See evaluation for individualized goals  Treatment Interventions: ADL retraining, Functional transfer training, UE strengthening/ROM, Endurance training, Cognitive reorientation, Patient/family training, Compensatory technique education, Energy conservation, Activityengagement, Equipment evaluation/education          See flowsheet documentation for full assessment, interventions and recommendations  Limitation: Decreased ADL status, Decreased UE strength, Decreased endurance, Decreased cognition, Decreased Safe judgement during ADL, Decreased self-care trans, Decreased high-level ADLs  Prognosis: Fair  Assessment: Pt is a 80 y o  male seen for OT evaluation s/p admit to [address] on 11/24/2017 w/ altered mental status, increased confusion and recent pneumonia  Comorbidities affecting pt's functional performance at time of assessment include: Dementia, sepsis, KULWINDER, CKD III, HTN, PAD, h/o CVA  Personal factors affecting pt at time of IE include: impaired cognition (increased time to follow directions, impaired insight, oriented to self)  Prior to admission, pt was at [facility] dementia unit  Pt is a poor historian and unable to provide information on prior level of functioning  Pt reports setup for grooming and self-feeding, assist w/ UB/LB ADLs, assist w/ toileting and mobility w/ SPC at baseline   Upon evaluation: Pt requires MOD assist x2 supine>sit bed mobility w/ MAX VCs and assistance to initiate tasks, MOD assist x 2 sit<>stand w/ VCs, MAX assist LB ADLS, MOD-MAX assist UB ADLS (cues to don hospital gown), MOD assist grooming, MAX assist toileting (incontinent) 2* the following deficits impacting occupational performance: impaired cognition (impaired insight, impaired attention, easily distracted, impaired safety awareness, difficulty following directions), impaired balance, impaired endurance, decreased strength, poor activity tolerance (lethargy)  Pt positioned upright in bed at end of session w/ alarm intact and all needs met  Pt to benefit from continued skilled OT tx while in the hospital to address deficits as defined above and maximize level of functional independence w ADL's and functional mobility  Occupational Performance areas to address include: eating, grooming, bathing/shower, toilet hygiene, dressing, functional mobility and clothing management  From OT standpoint, recommendation at time of d/c would be short term rehab       OT Discharge Recommendation: Short Term Rehab         Comments: Alysa Patrick MS, OTR/L

## 2017-11-27 NOTE — PHYSICAL THERAPY NOTE
PT Progress Note (27min)  (8:20-8:47)       11/27/17 0847   Pain Assessment   Pain Assessment No/denies pain   Restrictions/Precautions   Other Precautions Cognitive; Bed Alarm;Multiple lines; Fall Risk   General   Chart Reviewed Yes   Response to Previous Treatment Patient with no complaints from previous session  Family/Caregiver Present No   Cognition   Overall Cognitive Status Impaired   Arousal/Participation Lethargic   Orientation Level Oriented to person   Following Commands Follows one step commands with increased time or repetition   Subjective   Subjective pt resting in bed upon arrival  more alert than previous PT session, however remains confused oriented only to person  Bed Mobility   Supine to Sit 3  Moderate assistance   Additional items Assist x 2;HOB elevated; Increased time required;Verbal cues;LE management   Sit to Supine 3  Moderate assistance   Additional items Assist x 2;LE management;Verbal cues   Transfers   Sit to Stand 3  Moderate assistance   Additional items Assist x 2;Verbal cues   Stand to Sit 3  Moderate assistance   Additional items Assist x 2;Verbal cues   Ambulation/Elevation   Gait pattern Narrow JONA; Forward Flexion;Decreased foot clearance;Shuffling   Gait Assistance 3  Moderate assist   Additional items Assist x 2;Verbal cues  (c chair follow)   Assistive Device Rolling walker   Distance 15'   Balance   Static Sitting Fair   Dynamic Sitting Fair -   Static Standing Poor +   Dynamic Standing Poor   Ambulatory Poor   Activity Tolerance   Activity Tolerance Patient limited by fatigue   Nurse Made Aware Mike Zhang   Exercises   Heelslides Supine;10 reps;AAROM; Bilateral   Hip Abduction Supine;10 reps;AAROM; Bilateral   Assessment   Prognosis Fair   Problem List Decreased strength;Decreased endurance; Impaired balance;Decreased mobility; Decreased cognition; Impaired judgement;Decreased safety awareness   Assessment pt progressing c ambulation + more alert than previous session  progressed ambulation distance to 15' c RW mod (A)x2 + chair follow  max verbal cues required for safe RW management to avoid obstacles + direction  increased time required to complete all tasks 2* impaired cognition  pt incontinent of urine upon arrival requiring (A) c pericare  initiated B/L LE ther ex in supine c AAROM x10 reps, however pt lethargic c limited participation at end of session  returned to supine c bed alarm activated for increased safety at this time  will progress to sitting in chair as appropriate  will cont skilled PT to further maximize functional mobility + improve quality of life  upon d/c, recommend STR  Barriers to Discharge None   Goals   Patient Goals none stated   STG Expiration Date 12/05/17   Treatment Day 1   Plan   Treatment/Interventions Functional transfer training;LE strengthening/ROM; Therapeutic exercise; Endurance training;Patient/family training;Equipment eval/education; Bed mobility;Gait training;Spoke to nursing;OT   Progress Progressing toward goals   PT Frequency 5x/wk   Recommendation   Recommendation Short-term skilled PT   PT - OK to Discharge Yes  (to STR)     Nichole Glez, PT

## 2017-11-27 NOTE — OCCUPATIONAL THERAPY NOTE
[address] Evaluation     Patient Name: Mona Mcfadden  YQRZL'M Date: 11/27/2017  Problem List  Patient Active Problem List   Diagnosis    Vitamin D deficiency    [facility]    PAD (peripheral artery disease) (HCC)    GERD (gastroesophageal reflux disease)    CKD (chronic kidney disease) stage 4, GFR 15-29 ml/min (HCC)    Normocytic anemia    Hypothyroid    HTN (hypertension), benign    Dementia    Ambulatory dysfunction    Hyperlipidemia    Gout    Staphylococcus aureus septicemia (HCC)    CKD (chronic kidney disease) stage 3, GFR 30-59 ml/min    Acute renal failure superimposed on stage 3 chronic kidney disease (HCC)     Past Medical History  Past Medical History:   Diagnosis Date    KULWINDER (acute kidney injury) ([facility] ) 11/18/2017    Ambulatory dysfunction     Arthritis     CKD (chronic kidney disease) stage 4, GFR 15-29 ml/min ([facility])     [facility]A [facility]cere[facility] vascular accident) ([facility] )     Dementia     Disease of thyroid gland     Elevated blood uric acid level     GERD (gastroesophageal reflux disease)     GI bleed     Gout 11/18/2017    HTN (hypertension), benign     Hyperlipidemia     Hyperlipidemia     Hypertension     Hypothyroid     Normocytic anemia     PAD (peripheral artery disease) (HCC)     Pulmonary embolism (HCC)     Renal disorder     [facility] )     Stroke ([facility] )     Vitamin D deficiency      Past Surgical History  Past Surgical History:   Procedure Laterality Date    NO PAST SURGERIES             11/27/17 0845   Note Type   Note type Eval/Treat   Restrictions/Precautions   Weight Bearing Precautions Per Order No   Other Precautions Fall Risk;Cognitive; Bed Alarm; Chair Alarm;Multiple lines   Pain Assessment   Pain Assessment No/denies pain   Pain Score No Pain   Home Living   Type of Home SNF  (locked D[facility] unit [facility])   Home Layout One level   Bathroom Toilet Raised   Bathroom Accessibility Accessible   Home Equipment Cane   Additional Comments pt is a poor historian unable to provide history on living situation and prior level of functioning   Prior Function   Level of [place] (mobility w/ SPC per chart)   Lives With Facility staff   Receives Help From Personal care attendant   ADL Assistance Needs assistance  (reports independent grooming and self-feeding )   IADLs Needs assistance   Falls in the last 6 months (unable to report)   Vocational Retired   Comments pt poor historian unable to provide history   Lifestyle   Autonomy per pt independent self-feeding and grooming tasks, assist dressing and bathing, assist toileting, mobility w/ assist w/ SPC   Reciprocal Relationships daughter and facility staff   Service to Others retired, unable to report   Intrinsic Gratification going to activities   ADL   Where Assessed Edge of bed   Eating Assistance 3  Moderate Assistance   Grooming Assistance 3  Moderate Assistance   Grooming Deficit Setup;Verbal cueing;Supervision/safety; Increased time to complete;Wash/dry hands   UB Bathing Assistance 2  Maximal Assistance   LB Bathing Assistance 2  Maximal Assistance   [address] 3  Moderate Assistance   UB Dressing Deficit Setup;Verbal cueing;Supervision/safety; Increased time to complete; Thread RUE; Thread LUE  (don hospital gown)   LB Dressing Assistance 2  Maximal Assistance   Toileting Assistance  2  Maximal Assistance   Bed Mobility   Supine to Sit 3  Moderate assistance   Additional items Assist x 2; Increased time required;Verbal cues;LE management; Bedrails;HOB elevated   Sit to Supine 3  Moderate assistance   Additional items Assist x 2; Increased time required;Verbal cues;LE management; Bedrails   Additional Comments step by step directions, limited initiation of tasks   Transfers   Sit to Stand 3  Moderate assistance   Additional items Assist x 2; Increased time required;Verbal cues   Stand to Sit 3  Moderate assistance   Additional items Assist x 2; Increased time required;Verbal cues   Stand pivot 3  Moderate assistance   Additional items Assist x 2; Increased time required;Verbal cues   Additional Comments assist and verbal cues for safety t/o tasks   Functional Mobility   Functional Mobility 3  Moderate assistance   Additional Comments assist x1  w/ assist of 2nd for line management and maneuvaring walker and cues for safety   Additional items Rolling walker   Balance   Static Sitting Fair +   Dynamic Sitting Fair   Static Standing Poor +   Dynamic Standing Poor   Ambulatory Poor   Activity Tolerance   Activity Tolerance Patient limited by fatigue  (cognition)   Nurse Made Aware appropriate to see per Ashtyn DE LA CRUZ Assessment   RUE Assessment WFL  (4-/5)   LUE Assessment   LUE Assessment WFL  (4-/5)   Hand Function   Gross Motor Coordination Functional   Fine Motor Coordination Impaired   Sensation   Light Touch No apparent deficits   Vision-Basic Assessment   Current Vision Wears glasses only for reading   Vision - Complex Assessment   Ocular Range of Motion [facility]   Perception   Inattention/Neglect Appears intact   Motor Planning Cues to use objects appropriately   Cognition   Overall Cognitive Status Impaired   Arousal/Participation Poorly responsive;Lethargic   Attention Difficulty attending to directions   Orientation Level Oriented to person;Disoriented to place; Disoriented to time;Disoriented to situation   Memory Decreased short term memory;Decreased recall of recent events;Decreased recall of precautions;Decreased recall of biographical information   Following Commands Follows one step commands with increased time or repetition   Comments pt easily distracted, impaired STM, dementia at baseline, decreased safety awareness, requires MAX VCs for tasks and cuing t/o    Assessment   Limitation Decreased ADL status; Decreased UE strength;Decreased endurance;Decreased cognition;Decreased Safe judgement during ADL;Decreased self-care trans;Decreased high-level ADLs   Prognosis Fair   Assessment Pt is a 80 y o  male seen for OT evaluation s/p admit to [facility] on 11/24/2017 w/ altered mental status, increased confusion and recent pneumonia  Comorbidities affecting pt's functional performance at time of assessment include: Dementia, sepsis, KULWINDER, CKD III, HTN, PAD, h/o CVA  Personal factors affecting pt at time of IE include: impaired cognition (increased time to follow directions, impaired insight, oriented to self)  Prior to admission, pt was at [facility] dementia unit  Pt is a poor historian and unable to provide information on prior level of functioning  Pt reports setup for grooming and self-feeding, assist w/ UB/LB ADLs, assist w/ toileting and mobility w/ SPC at baseline  Upon evaluation: Pt requires MOD assist x2 supine>sit bed mobility w/ MAX VCs and assistance to initiate tasks, MOD assist x 2 sit<>stand w/ VCs, MAX assist LB ADLS, MOD-MAX assist UB ADLS (cues to don hospital gown), MOD assist grooming, MAX assist toileting (incontinent) 2* the following deficits impacting occupational performance: impaired cognition (impaired insight, impaired attention, easily distracted, impaired safety awareness, difficulty following directions), impaired balance, impaired endurance, decreased strength, poor activity tolerance (lethargy)  Pt positioned upright in bed at end of session w/ alarm intact and all needs met  Pt to benefit from continued skilled OT tx while in the hospital to address deficits as defined above and maximize level of functional independence w ADL's and functional mobility  Occupational Performance areas to address include: eating, grooming, bathing/shower, toilet hygiene, dressing, functional mobility and clothing management  From OT standpoint, recommendation at time of d/c would be short term rehab     Goals   Patient Goals none expressed   LTG Time Frame 7-10   Long Term Goal please see below goals   Plan   Treatment Interventions ADL retraining;Functional transfer training;UE strengthening/ROM; Endurance training;Cognitive reorientation;Patient/family training; Compensatory technique education; Energy conservation; Activityengagement;Equipment evaluation/education   Goal Expiration Date 12/07/17   OT Frequency 3-5x/wk   Recommendation   OT Discharge Recommendation Short Term Rehab   Barthel Index   Feeding 5   Bathing 0   Grooming Score 0   Dressing Score 0   Bladder Score 0   Bowels Score 0   Toilet Use Score 5   Transfers (Bed/Chair) Score 5   Mobility (Level Surface) Score 0   Stairs Score 0   Barthel Index Score 15   Modified [place] Scale   Modified [place] Scale 4     Occupational Therapy Goals to be met in 7-10 days:  1) Pt will improve activity tolerance to G for min 30 min txment sessions  2) Pt will complete ADLs/self care/grooming w/ min assist and LB ADLS w/ MOD assist  3) Pt will complete toileting w/ min assist w/ G hygiene/thoroughness using DME PRN  4) Pt will improve functional transfers on/off all surfaces using DME PRN w/ G balance/safety including toileting w/ min assist  5) Pt will improve fx'l mobility during I/ADl/leisure tasks using DME PRN w/ g balance/safety w/ min assist  6) Pt will engage in ongoing cognitive assessment w/ G participation to A w/ safe d/c planning/recommendations  7) Pt will demonstrate G carryover of pt/caregiver education and training as appropriate w/ mod I  w/ G tolerance  8) Pt will engage in depression screen/leisure interest checklist w/ G participation to monitor s/s depression and ID 3 positive coping strategies to A w/ emotional regulation and management  9) Pt will demonstrate 100% carryover of E C  techniques w/ mod I t/o fx'l I/ADL/leisure tasks w/o cues s/p skilled education  10) Pt will tolerate bed mobility and EOB seated tasks w/ min A for 30 mins to engage in fx'l I/ADL/leisure tasks w/ min A w/ min cues  11) Pt will demonstrate improved b/l UE strength by 1 MMT grade to enhance ADLS and functional transfers  12) Pt will follow simple 1 step commands >75%  and be A&Ox 3 t/o OT sessions w/ MIN VCs and environmental cues  13) Pt will tolerate upright positioning and complete self-feeding w/ min assist    Documentation completed by: Garland Lawson MS, OTR/L

## 2017-11-27 NOTE — ASSESSMENT & PLAN NOTE
· Present on admission and resolved with creatinine at baseline  · Continue to monitor BMP, caution with Nephro toxic medications, renally dose medications, avoid drastic changes in blood pressure

## 2017-11-27 NOTE — ASSESSMENT & PLAN NOTE
· Blood pressure is in acceptable range for age especially given history of CKD will avoid drastic decrease in blood pressure  · Continue current medication regimen  · Monitor BMP

## 2017-11-27 NOTE — ASSESSMENT & PLAN NOTE
· Currently resides in locked dementia unit as has history elopement  · At this time patient is calmer and somnolent likely due to bacteremia and no need for one-to-one at this point, however, will watch closely should need for one-to-one observation arise  · Continue dementia medications per home regimen

## 2017-11-27 NOTE — ASSESSMENT & PLAN NOTE
· Unclear source of patient's septicemia  · Continue vancomycin as per Infectious Disease recommendations, continue to monitor Vanco trough  · 2D echo noted for diastolic dysfunction, it did not revealed vegetations, however, cannot definitively rule them out    · Patient may need a transesophageal echocardiogram, this will need to be discussed with family for consent and confirm recommendations with Infectious Disease

## 2017-11-27 NOTE — PROGRESS NOTES
Progress Note - Infectious Disease   Hank Fernandez 80 y o  male MRN: [id number]  Unit/Bed#: [facility] 2 -02 Encounter: [id number]         Impression/Recommendations:  1  Sepsis, POA   Fevers, tachycardia   Secondary to #2  Tachycardia improving   Fevers trending down as well  Otherwise, patient is nontoxic appearing and hemodynamically stable      -  Antibiotics as below  - monitor WBC count and temperatures closely  - monitor hemodynamics  - follow-up blood culture results     2    Staph aureus bacteremia  Unclear source at this point   Patient did have a recent hospitalization for KULWINDER and received IV fluids, but no other recent interventions   CT abdomen and pelvis negative for any acute disease   Showed a patchy consolidation in the right lower lobe which is more likely atelectasis as patient not having any respiratory symptoms    Reviewed patient's medical history and do not see any history of hardware in his body, and he denies any as well  No skin lesions or wounds  He may have endocarditis      - continue with vancomycin  - check vancomycin trough prior to 4th dose  Today  - follow-up sensitivities of blood cultures drawn on admission 11/24- so far no growth to date  -  Follow-up repeat blood culture sent 11/25 to ensure clearance  -  Check echocardiogram today     3  KULWINDER on CKD   Creatinine elevated at 2 1  In the setting of poor p o  Intake and #2  Creatinine continues to improve      - fluid management per primary team  - monitor creatinine  -  Adjust vancomycin dose based on renal function     4  Right lower lobe opacity   I suspect that this is more likely atelectasis rather than pneumonia  The patient did not present with any significant respiratory symptoms and O2 saturations are stable      -  Monitor respiratory status closely  - Monitor for any new respiratory symptoms     4  Advanced dementia  Patient lives in a locked dementia unit      5  History of gout   No clinical signs to suggest acute gout on physical exam   -  Serial exams        Antibiotics:  Vancomycin D4    Subjective:    Patient laying comfortably in bed  Minimally conversant  Expresses no complaints  No fevers or chills  No chest pain  Objective:  Vitals:  HR:  [57-95] 57  Resp:  [18] 18  BP: (145-167)/(70-92) 145/92  SpO2:  [95 %-96 %] 95 %  Temp (24hrs), Av 1 °F (36 7 °C), Min:97 1 °F (36 2 °C), Max:99 2 °F (37 3 °C)  Current: Temperature: (!) 97 1 °F (36 2 °C)    Physical Exam:   General:  No acute distress  Psychiatric:  Awake and alert  Pulmonary:  Normal respiratory excursion without accessory muscle use  Abdomen:  Soft, nontender  Extremities:   Mild lower extremity edema  Skin:  No rashes   no stigmata of endocarditis    Lab Results:  I have personally reviewed pertinent labs  Results from last 7 days  Lab Units 17  0537 17  1202 17  0443   SODIUM mmol/L 142 138 141   POTASSIUM mmol/L 3 7 3 9 4 0   CHLORIDE mmol/L 111* 104 107   CO2 mmol/L 22 26 28   ANION GAP mmol/L 9 8 6   BUN mg/dL 31* 34* 40*   CREATININE mg/dL 1 58* 2 14* 1 71*   EGFR ml/min/1 73sq m 37 26 34   GLUCOSE RANDOM mg/dL 105 134 91   CALCIUM mg/dL 8 1* 8 7 8 1*   AST U/L 30 31  --    ALT U/L 26 27  --    ALK PHOS U/L 52 64  --    TOTAL PROTEIN g/dL 5 7* 7 0  --    ALBUMIN g/dL 2 3* 3 2*  --    BILIRUBIN TOTAL mg/dL 0 49 0 83  --        Results from last 7 days  Lab Units 17  0537 17  1202   WBC Thousand/uL 6 52 9 78   HEMOGLOBIN g/dL 9 7* 10 9*   PLATELETS Thousands/uL 159 167       Results from last 7 days  Lab Units 17  1825 17  2048 17  1203 17  1202   BLOOD CULTURE  No Growth at 24 hrs    No Growth at 24 hrs   --    Staphylococcus aureus*   Staphylococcus aureus*   GRAM STAIN RESULT   --   --  Gram positive cocci in clusters Gram positive cocci in clusters   MRSA CULTURE ONLY   --  No Methicillin Resistant Staphlyococcus aureus (MRSA) isolated  --   --        Imaging Studies:   I have personally reviewed pertinent imaging study reports and images in PACS  EKG, Pathology, and Other Studies:   I have personally reviewed pertinent reports

## 2017-11-27 NOTE — PROGRESS NOTES
Progress Note Oliverio Muniz 80 y o  male MRN: [id number]    Unit/Bed#: [facility] 2 -02 Encounter: [id number]        * Staphylococcus aureus septicemia ([facility] )   Assessment & Plan    · Unclear source of patient's septicemia  · Continue vancomycin as per Infectious Disease recommendations, continue to monitor Vanco trough  · 2D echo noted for diastolic dysfunction, it did not revealed vegetations, however, cannot definitively rule them out  · Patient may need a transesophageal echocardiogram, this will need to be discussed with family for consent and confirm recommendations with Infectious Disease        Acute renal failure superimposed on stage 3 chronic kidney disease ([facility] )   Assessment & Plan    · Present on admission and resolved with creatinine at baseline  · Continue to monitor BMP, caution with Nephro toxic medications, renally dose medications, avoid drastic changes in blood pressure        Ambulatory dysfunction   Assessment & Plan    · Present on admission, PT and OT consult        Dementia   Assessment & Plan    · Currently resides in locked dementia unit as has history elopement  · At this time patient is calmer and somnolent likely due to bacteremia and no need for one-to-one at this point, however, will watch closely should need for one-to-one observation arise  · Continue dementia medications per home regimen        HTN (hypertension), benign   Assessment & Plan    · Blood pressure is in acceptable range for age especially given history of CKD will avoid drastic decrease in blood pressure  · Continue current medication regimen  · Monitor BMP        Normocytic anemia   Assessment & Plan    · Mild and stable, likely due to chronic disease  · Continue to monitor CBC daily          Pharmacologic: Heparin  Mechanical VTE Prophylaxis in Place: No    Patient Centered Rounds: I have performed bedside rounds with nursing staff today      Discussions with Specialists or Other Care Team Provider: Reviewed documentation    Education and Discussions with Family / Patient: No    Time Spent for Care: 30 minutes  More than 50% of total time spent on counseling and coordination of care as described above  Current Length of Stay: 3 day(s)    Current Patient Status: Inpatient   Certification Statement: The patient will continue to require additional inpatient hospital stay due to need for close monitroing and IV antibiotics    Discharge Plan / Estimated Discharge Date: to be determined      Code Status: Level 3 - DNAR and DNI      Subjective:   Patient seen and examined  Patient is oriented to self only which is his baseline mental status  The patient appears comfortable  He denies any pain  He is a poor historian due to baseline dementia  No overnight events  Objective:     Vitals:   Temp (24hrs), Av 2 °F (36 8 °C), Min:97 1 °F (36 2 °C), Max:99 2 °F (37 3 °C)    HR:  [57-95] 57  Resp:  [18] 18  BP: (145-167)/(71-92) 145/92  SpO2:  [95 %-96 %] 95 %  Body mass index is 32 42 kg/m²  Input and Output Summary (last 24 hours):     No intake or output data in the 24 hours ending 17    Physical Exam:     Physical Exam   Constitutional: No distress  HENT:   Mouth/Throat: Oropharynx is clear and moist    Eyes: Conjunctivae are normal    Neck: No JVD present  Cardiovascular: Normal rate and regular rhythm  Exam reveals no gallop and no friction rub  No murmur heard  Pulmonary/Chest: Effort normal  No respiratory distress  He has no wheezes  Abdominal: Soft  He exhibits no distension  There is no tenderness  There is no guarding  Musculoskeletal: He exhibits no edema  Neurological: He is alert  Skin: Skin is warm and dry         Additional Data:     Labs:      Results from last 7 days  Lab Units 17  1654  17  1202   WBC Thousand/uL 6 29  < > 9 78   HEMOGLOBIN g/dL 10 5*  < > 10 9*   HEMATOCRIT % 31 0*  < > 32 2*   PLATELETS Thousands/uL 226  < > 167   NEUTROS PCT %  --   -- 88*   LYMPHS PCT %  --   --  4*   MONOS PCT %  --   --  8   EOS PCT %  --   --  0   < > = values in this interval not displayed  Results from last 7 days  Lab Units 11/27/17  1654   SODIUM mmol/L 143   POTASSIUM mmol/L 3 7   CHLORIDE mmol/L 108   CO2 mmol/L 23   BUN mg/dL 28*   CREATININE mg/dL 1 66*   CALCIUM mg/dL 8 2*   TOTAL PROTEIN g/dL 6 3*   BILIRUBIN TOTAL mg/dL 0 40   ALK PHOS U/L 57   ALT U/L 27   AST U/L 26   GLUCOSE RANDOM mg/dL 112       Results from last 7 days  Lab Units 11/24/17  1202   INR  1 09         Recent Cultures (last 7 days):       Results from last 7 days  Lab Units 11/25/17  1825 11/24/17  1203 11/24/17  1202   BLOOD CULTURE  No Growth at 24 hrs  No Growth at 24 hrs     Staphylococcus aureus*   Staphylococcus aureus*   GRAM STAIN RESULT   --  Gram positive cocci in clusters Gram positive cocci in clusters       Last 24 Hours Medication List:     amLODIPine 10 mg Oral Daily   calcitriol 0 25 mcg Oral Every Other Day   memantine 10 mg Oral BID   And      donepezil 5 mg Oral BID   heparin (porcine) 5,000 Units Subcutaneous Q8H [address]   levothyroxine 125 mcg Oral Daily   nystatin  Topical BID   pravastatin 40 mg Oral Daily With Dinner   vancomycin 1,000 mg Intravenous Q24H        Today, Patient Was Seen By: Jean Stein MD

## 2017-11-27 NOTE — PLAN OF CARE
Problem: PHYSICAL THERAPY ADULT  Goal: Performs mobility at highest level of function for planned discharge setting  See evaluation for individualized goals  Treatment/Interventions: Functional transfer training, LE strengthening/ROM, Therapeutic exercise, Endurance training, Patient/family training, Bed mobility, Gait training, Equipment eval/education, Spoke to nursing          See flowsheet documentation for full assessment, interventions and recommendations  Outcome: Progressing  Prognosis: Fair  Problem List: Decreased strength, Decreased endurance, Impaired balance, Decreased mobility, Decreased cognition, Impaired judgement, Decreased safety awareness  Assessment: pt progressing c ambulation + more alert than previous session  progressed ambulation distance to 15' c RW mod (A)x2 + chair follow  max verbal cues required for safe RW management to avoid obstacles + direction  increased time required to complete all tasks 2* impaired cognition  pt incontinent of urine upon arrival requiring (A) c pericare  initiated B/L LE ther ex in supine c AAROM x10 reps, however pt lethargic c limited participation at end of session  returned to supine c bed alarm activated for increased safety at this time  will progress to sitting in chair as appropriate  will cont skilled PT to further maximize functional mobility + improve quality of life  upon d/c, recommend STR  Barriers to Discharge: None     Recommendation: Short-term skilled PT     PT - OK to Discharge: Yes (to STR)    See flowsheet documentation for full assessment

## 2017-11-27 NOTE — PLAN OF CARE

## 2017-11-28 PROBLEM — E87.6 HYPOKALEMIA: Status: ACTIVE | Noted: 2017-11-28

## 2017-11-28 LAB
ANION GAP SERPL CALCULATED.3IONS-SCNC: 9 MMOL/L (ref 4–13)
BACTERIA BLD CULT: ABNORMAL
BASOPHILS # BLD AUTO: 0.02 THOUSANDS/ΜL (ref 0–0.1)
BASOPHILS NFR BLD AUTO: 0 % (ref 0–1)
BUN SERPL-MCNC: 29 MG/DL (ref 5–25)
CALCIUM SERPL-MCNC: 7.6 MG/DL (ref 8.3–10.1)
CHLORIDE SERPL-SCNC: 111 MMOL/L (ref 100–108)
CO2 SERPL-SCNC: 23 MMOL/L (ref 21–32)
CREAT SERPL-MCNC: 1.54 MG/DL (ref 0.6–1.3)
EOSINOPHIL # BLD AUTO: 0.22 THOUSAND/ΜL (ref 0–0.61)
EOSINOPHIL NFR BLD AUTO: 4 % (ref 0–6)
ERYTHROCYTE [DISTWIDTH] IN BLOOD BY AUTOMATED COUNT: 13.5 % (ref 11.6–15.1)
GFR SERPL CREATININE-BSD FRML MDRD: 38 ML/MIN/1.73SQ M
GLUCOSE SERPL-MCNC: 95 MG/DL (ref 65–140)
GRAM STN SPEC: ABNORMAL
HCT VFR BLD AUTO: 25.4 % (ref 36.5–49.3)
HGB BLD-MCNC: 8.6 G/DL (ref 12–17)
LYMPHOCYTES # BLD AUTO: 1.47 THOUSANDS/ΜL (ref 0.6–4.47)
LYMPHOCYTES NFR BLD AUTO: 25 % (ref 14–44)
MCH RBC QN AUTO: 30.9 PG (ref 26.8–34.3)
MCHC RBC AUTO-ENTMCNC: 33.9 G/DL (ref 31.4–37.4)
MCV RBC AUTO: 91 FL (ref 82–98)
MONOCYTES # BLD AUTO: 0.8 THOUSAND/ΜL (ref 0.17–1.22)
MONOCYTES NFR BLD AUTO: 14 % (ref 4–12)
NEUTROPHILS # BLD AUTO: 3.37 THOUSANDS/ΜL (ref 1.85–7.62)
NEUTS SEG NFR BLD AUTO: 57 % (ref 43–75)
NRBC BLD AUTO-RTO: 0 /100 WBCS
PLATELET # BLD AUTO: 188 THOUSANDS/UL (ref 149–390)
PMV BLD AUTO: 10.9 FL (ref 8.9–12.7)
POTASSIUM SERPL-SCNC: 3.4 MMOL/L (ref 3.5–5.3)
RBC # BLD AUTO: 2.78 MILLION/UL (ref 3.88–5.62)
SODIUM SERPL-SCNC: 143 MMOL/L (ref 136–145)
WBC # BLD AUTO: 5.88 THOUSAND/UL (ref 4.31–10.16)

## 2017-11-28 PROCEDURE — 85025 COMPLETE CBC W/AUTO DIFF WBC: CPT | Performed by: FAMILY MEDICINE

## 2017-11-28 PROCEDURE — 80048 BASIC METABOLIC PNL TOTAL CA: CPT | Performed by: FAMILY MEDICINE

## 2017-11-28 RX ADMIN — CALCITRIOL 0.25 MCG: 0.25 CAPSULE, LIQUID FILLED ORAL at 10:00

## 2017-11-28 RX ADMIN — LEVOTHYROXINE SODIUM 125 MCG: 125 TABLET ORAL at 06:27

## 2017-11-28 RX ADMIN — HEPARIN SODIUM 5000 UNITS: 5000 INJECTION, SOLUTION INTRAVENOUS; SUBCUTANEOUS at 06:27

## 2017-11-28 RX ADMIN — HEPARIN SODIUM 5000 UNITS: 5000 INJECTION, SOLUTION INTRAVENOUS; SUBCUTANEOUS at 21:14

## 2017-11-28 RX ADMIN — PRAVASTATIN SODIUM 40 MG: 40 TABLET ORAL at 17:30

## 2017-11-28 RX ADMIN — DONEPEZIL HYDROCHLORIDE 5 MG: 5 TABLET, FILM COATED ORAL at 10:00

## 2017-11-28 RX ADMIN — MEMANTINE 10 MG: 5 TABLET ORAL at 10:00

## 2017-11-28 RX ADMIN — CEFAZOLIN SODIUM 2000 MG: 2 SOLUTION INTRAVENOUS at 17:19

## 2017-11-28 RX ADMIN — CEFAZOLIN SODIUM 2000 MG: 2 SOLUTION INTRAVENOUS at 11:24

## 2017-11-28 RX ADMIN — MEMANTINE 10 MG: 5 TABLET ORAL at 17:31

## 2017-11-28 RX ADMIN — NYSTATIN: 100000 POWDER TOPICAL at 18:30

## 2017-11-28 RX ADMIN — SODIUM CHLORIDE 50 ML/HR: 0.9 INJECTION, SOLUTION INTRAVENOUS at 10:28

## 2017-11-28 RX ADMIN — NYSTATIN 1 APPLICATION: 100000 POWDER TOPICAL at 10:00

## 2017-11-28 RX ADMIN — HEPARIN SODIUM 5000 UNITS: 5000 INJECTION, SOLUTION INTRAVENOUS; SUBCUTANEOUS at 14:34

## 2017-11-28 RX ADMIN — DONEPEZIL HYDROCHLORIDE 5 MG: 5 TABLET, FILM COATED ORAL at 17:32

## 2017-11-28 RX ADMIN — AMLODIPINE BESYLATE 10 MG: 10 TABLET ORAL at 10:00

## 2017-11-28 NOTE — PROGRESS NOTES
Progress Note - Infectious Disease   Yumi Oconnor 80 y o  male MRN: [id number]  Unit/Bed#: [facility] 2 -02 Encounter: [id number]    Impression/Recommendations:  1  Sepsis, POA   Fevers, tachycardia   Secondary to #2  Tachycardia improving   Fevers trending down as well  Otherwise, patient is nontoxic appearing and hemodynamically stable      -  Antibiotics as below  - monitor WBC count and temperatures closely  - monitor hemodynamics     2   MSSA bacteremia  Unclear source at this point   Patient did have a recent hospitalization for KULWINDER and received IV fluids, but no other recent interventions   CT abdomen and pelvis negative for any acute disease   Showed a patchy consolidation in the right lower lobe which is more likely atelectasis as patient not having any respiratory symptoms    Reviewed patient's medical history and do not see any history of hardware in his body, and he denies any as well    No skin lesions or wounds  No joint abnormalities  TTE negative for any obvious vegetation      -   Change vancomycin to cefazolin  Patient will need at least a 4 week course  -  Follow-up repeat blood culture sent 11/25 to ensure clearance- so far negative  -    Ideally, patient would need a transesophageal echocardiogram to definitively rule out a vegetation      3  KULWINDER on CKD   Creatinine elevated at 2 1  In the setting of poor p o  Intake and #2  Creatinine continues to improve      - fluid management per primary team  - monitor creatinine     4  Right lower lobe opacity   I suspect that this is more likely atelectasis rather than pneumonia  The patient did not present with any significant respiratory symptoms and O2 saturations are stable      -  Monitor respiratory status closely  - Monitor for any new respiratory symptoms     4  Advanced dementia  Patient lives in a locked dementia unit      5  History of gout   No clinical signs to suggest acute gout on physical exam   -  Serial exams        Antibiotics:  Vancomycin D5    Spoke with Dr Deondre Hatfield regarding plan of care  Subjective:  Patient seen on AM rounds  Denies fevers, chills, or sweats  Denies nausea, vomiting, or diarrhea  Objective:  Vitals:  HR:  [] 71  Resp:  [18] 18  BP: (135-147)/(68-85) 135/68  SpO2:  [95 %-97 %] 97 %  Temp (24hrs), Av 6 °F (36 4 °C), Min:97 5 °F (36 4 °C), Max:97 6 °F (36 4 °C)  Current: Temperature: 97 6 °F (36 4 °C)    Physical Exam:   General:  No acute distress  Psychiatric:  Awake and alert  Pulmonary:  Normal respiratory excursion without accessory muscle use  Abdomen:  Soft, nontender  Extremities:  Mild edema  Skin:  No rashes  No stigmata of endocarditis    Lab Results:  I have personally reviewed pertinent labs  Results from last 7 days  Lab Units 17  0631 17  1654 17  0537 17  1202   SODIUM mmol/L 143 143 142 138   POTASSIUM mmol/L 3 4* 3 7 3 7 3 9   CHLORIDE mmol/L 111* 108 111* 104   CO2 mmol/L 23 23 22 26   ANION GAP mmol/L 9 12 9 8   BUN mg/dL 29* 28* 31* 34*   CREATININE mg/dL 1 54* 1 66* 1 58* 2 14*   EGFR ml/min/1 73sq m 38 35 37 26   GLUCOSE RANDOM mg/dL 95 112 105 134   CALCIUM mg/dL 7 6* 8 2* 8 1* 8 7   AST U/L  --  26 30 31   ALT U/L  --  27 26 27   ALK PHOS U/L  --  57 52 64   TOTAL PROTEIN g/dL  --  6 3* 5 7* 7 0   ALBUMIN g/dL  --  2 6* 2 3* 3 2*   BILIRUBIN TOTAL mg/dL  --  0 40 0 49 0 83       Results from last 7 days  Lab Units 17  0631 17  1654 17  0537   WBC Thousand/uL 5 88 6 29 6 52   HEMOGLOBIN g/dL 8 6* 10 5* 9 7*   PLATELETS Thousands/uL 188 226 159       Results from last 7 days  Lab Units 17  1825 17  2048 17  1203 17  1202   BLOOD CULTURE  No Growth at 48 hrs  No Growth at 48 hrs    --    Staphylococcus aureus*   Staphylococcus aureus*   GRAM STAIN RESULT   --   --  Gram positive cocci in clusters Gram positive cocci in clusters   MRSA CULTURE ONLY   --  No Methicillin Resistant Staphlyococcus aureus (MRSA) isolated  --   --        Imaging Studies:   I have personally reviewed pertinent imaging study reports and images in PACS  EKG, Pathology, and Other Studies:   I have personally reviewed pertinent reports

## 2017-11-28 NOTE — ASSESSMENT & PLAN NOTE
Unclear etiology, appears to be MSSA septicemia, repeat cultures thus far negative, MRSA screen negative, patient's has been afebrile without leukocytosis  No complaint of chest pain or shortness of breath, patient does have right lower lobe patchy infiltrate on x-ray which is likely atelectasis  TTE unremarkable for any vegetation or evidence of abscess or endocarditis  Patient was on vancomycin due to recent hospitalization now switched Ancef by ID  Culture and sensitivity of 1 of to notes near pan sensitivity save resistance to ampicillin 2nd sensitivity pending  Discussed with patient's daughter at length including risk of reinfection for possible endocarditis, heart attack, septic emboli to brain/lungs, etc , patient's daughter is a nurse and next of kin with other siblings    At this time deferring GABE given advanced age and dementia which is in coordination of daughter's request   Pt's daughter to convey w/family

## 2017-11-28 NOTE — PROGRESS NOTES
Progress Note Luciano Benson 80 y o  male MRN: [id number]    Unit/Bed#: [facility] 2 -02 Encounter: [id number]        Hypokalemia   Assessment & Plan      Will replete w/80meq over 2 doses  Recheck and check mag in AM        Acute renal failure superimposed on stage 3 chronic kidney disease (HCC)   Assessment & Plan    Pt w/KULWINDER on admission now improved at BL at 1 5-1 6 over last 3 days  Will d/c IVF  Continue to avoid nephrotoxins/avoid relative hypotension        Hyperlipidemia   Assessment & Plan      Continue statin        Ambulatory dysfunction   Assessment & Plan       Appreciate PT evaluation, patient will need short-term rehab upon discharge        Dementia   Assessment & Plan      At baseline, patient in locked dementia unit at baseline  Continue Aricept the Namenda        HTN (hypertension), benign   Assessment & Plan    Controlled, -141 over last 2 days  Continue amlodipine, continue metoprolol p r n          Hypothyroid   Assessment & Plan      Continue levothyroxine        Normocytic anemia   Assessment & Plan       Normocytic anemia in the setting of chronic kidney disease, hemoglobin 8 6 today has been from 9 510 7 over last several days no active signs or symptoms of bleeding   Will continue to monitor        CKD (chronic kidney disease) stage 4, GFR 15-29 ml/min ([facility])   Assessment & Plan      Pt w/KULWINDER on admission now improved at BL at 1 5-1 6 over last 3 days  Will d/c IVF  Continue to avoid nephrotoxins/avoid relative hypotension        * Staphylococcus aureus septicemia ([facility])   Assessment & Plan    Unclear etiology, appears to be MSSA septicemia, repeat cultures thus far negative, MRSA screen negative, patient's has been afebrile without leukocytosis  No complaint of chest pain or shortness of breath, patient does have right lower lobe patchy infiltrate on x-ray which is likely atelectasis  TTE unremarkable for any vegetation or evidence of abscess or endocarditis  Patient was on vancomycin due to recent hospitalization now switched Ancef by ID  Culture and sensitivity of 1 of to notes near pan sensitivity save resistance to ampicillin 2nd sensitivity pending  Discussed with patient's daughter at length including risk of reinfection for possible endocarditis, heart attack, septic emboli to brain/lungs, etc , patient's daughter is a nurse and next of kin with other siblings  At this time deferring GABE given advanced age and dementia which is in coordination of daughter's request   Pt's daughter to convey w/family                            VTE Pharmacologic Prophylaxis:   Pharmacologic: Heparin  Mechanical VTE Prophylaxis in Place: Yes    Patient Centered Rounds: I have performed bedside rounds with nursing staff today  Discussions with Specialists or Other Care Team Provider:     Education and Discussions with Family / Patient: spoke w/daughter at length about disposition, and bacteremia    Time Spent for Care: 20 minutes  More than 50% of total time spent on counseling and coordination of care as described above  Current Length of Stay: 4 day(s)    Current Patient Status: Inpatient   Certification Statement: The patient will continue to require additional inpatient hospital stay due to bacteremia    Discharge Plan / Estimated Discharge Date:     Code Status: Level 3 - DNAR and DNI      Subjective:   No events overnight per nursing, no diarrhea, no fevers  Patient responds only nonsensical responses, likely due to underlying dementia  He does shake his head no when asked if he has any pain, however  When asked if he could be examined he said what better get over there is and pointed towards the door  He is otherwise conversational and pleasant  Objective:     Vitals:   Temp (24hrs), Av 5 °F (36 4 °C), Min:97 5 °F (36 4 °C), Max:97 6 °F (36 4 °C)    HR:  [] 70  Resp:  [18] 18  BP: (135-147)/(61-85) 141/61  SpO2:  [95 %-97 %] 96 %  Body mass index is 32 42 kg/m²  Input and Output Summary (last 24 hours):     No intake or output data in the 24 hours ending 11/28/17 1550    Physical Exam:     Physical Exam   Constitutional: He appears well-developed  No distress  HENT:   Head: Normocephalic and atraumatic  Right Ear: External ear normal    Left Ear: External ear normal    Eyes: Conjunctivae are normal    Neck: Normal range of motion  Cardiovascular: Normal rate, regular rhythm and intact distal pulses  Exam reveals friction rub  Exam reveals no gallop  No murmur heard  Pulmonary/Chest: No respiratory distress  He has no wheezes  He has no rales  Abdominal: Soft  He exhibits no distension  There is no tenderness  There is no rebound and no guarding  Musculoskeletal: He exhibits no edema  Neurological: He is alert  Opens eyes to voice, follow commands with breathing, can squeeze hands  Skin: Skin is warm and dry  He is not diaphoretic  Psychiatric:   Nonsensical verbal responses to questioning regarding nausea/vomiting  Pt does speak in full sentences   Vitals reviewed  Additional Data:     Labs:      Results from last 7 days  Lab Units 11/28/17  0631   WBC Thousand/uL 5 88   HEMOGLOBIN g/dL 8 6*   HEMATOCRIT % 25 4*   PLATELETS Thousands/uL 188   NEUTROS PCT % 57   LYMPHS PCT % 25   MONOS PCT % 14*   EOS PCT % 4       Results from last 7 days  Lab Units 11/28/17  0631 11/27/17  1654   SODIUM mmol/L 143 143   POTASSIUM mmol/L 3 4* 3 7   CHLORIDE mmol/L 111* 108   CO2 mmol/L 23 23   BUN mg/dL 29* 28*   CREATININE mg/dL 1 54* 1 66*   CALCIUM mg/dL 7 6* 8 2*   TOTAL PROTEIN g/dL  --  6 3*   BILIRUBIN TOTAL mg/dL  --  0 40   ALK PHOS U/L  --  57   ALT U/L  --  27   AST U/L  --  26   GLUCOSE RANDOM mg/dL 95 112       Results from last 7 days  Lab Units 11/24/17  1202   INR  1 09       * I Have Reviewed All Lab Data Listed Above  * Additional Pertinent Lab Tests Reviewed:  All Labs Within Last 24 Hours Reviewed    Imaging:    Imaging Reports Reviewed Today Include:   Imaging Personally Reviewed by Myself Includes:      Recent Cultures (last 7 days):       Results from last 7 days  Lab Units 11/25/17  1825 11/24/17  1203 11/24/17  1202   BLOOD CULTURE  No Growth at 48 hrs  No Growth at 48 hrs  Staphylococcus aureus*   Staphylococcus aureus*   GRAM STAIN RESULT   --  Gram positive cocci in clusters Gram positive cocci in clusters       Last 24 Hours Medication List:     amLODIPine 10 mg Oral Daily   calcitriol 0 25 mcg Oral Every Other Day   cefazolin 2,000 mg Intravenous Q8H   memantine 10 mg Oral BID   And      donepezil 5 mg Oral BID   heparin (porcine) 5,000 Units Subcutaneous Q8H [address]   levothyroxine 125 mcg Oral Daily   nystatin  Topical BID   pravastatin 40 mg Oral Daily With Dinner        Today, Patient Was Seen By: Enio Hernandez PA-C    ** Please Note: This note has been constructed using a voice recognition system   **

## 2017-11-28 NOTE — ASSESSMENT & PLAN NOTE
Pt w/KULWINDER on admission now improved at BL at 1 5-1 6 over last 3 days  Will d/c IVF  Continue to avoid nephrotoxins/avoid relative hypotension

## 2017-11-28 NOTE — CASE MANAGEMENT
Continued Stay Review    Date:    11/28/2017    Vital Signs: /68   Pulse 71   Temp 97 6 °F (36 4 °C) (Tympanic)   Resp 18   Wt 102 kg (225 lb 15 5 oz)   SpO2 97%   BMI 32 42 kg/m²     Medications:   Scheduled Meds:   amLODIPine 10 mg Oral Daily   calcitriol 0 25 mcg Oral Every Other Day   cefazolin 2,000 mg Intravenous Q8H   memantine 10 mg Oral BID   And      donepezil 5 mg Oral BID   heparin (porcine) 5,000 Units Subcutaneous Q8H [facility]   levothyroxine 125 mcg Oral Daily   nystatin  Topical BID   pravastatin 40 mg Oral Daily With Dinner     Continuous Infusions:   sodium chloride 50 mL/hr Last Rate: 50 mL/hr (11/28/17 1028)     PRN Meds:   acetaminophen    aluminum-magnesium hydroxide-simethicone    loperamide    metoprolol    ondansetron    Abnormal Labs/Diagnostic Results:    H/H    8 6/25 4  RBC    2 78  K   3 4  BUN/Creat    29//1 54    Age/Sex: 80 y o  male     Assessment/Plan:      Staphylococcus aureus septicemia (HCC)   Assessment & Plan     · Unclear source of patient's septicemia  · Continue vancomycin as per Infectious Disease recommendations, continue to monitor Vanco trough  · 2D echo noted for diastolic dysfunction, it did not revealed vegetations, however, cannot definitively rule them out    · Patient may need a transesophageal echocardiogram, this will need to be discussed with family for consent and confirm recommendations with Infectious Disease       Acute renal failure superimposed on stage 3 chronic kidney disease ([facility] [address] )   Assessment & Plan     · Present on admission and resolved with creatinine at baseline  · Continue to monitor BMP, caution with Nephro toxic medications, renally dose medications, avoid drastic changes in blood pressure       Ambulatory dysfunction   Assessment & Plan     · Present on admission, PT and OT consult       Dementia   Assessment & Plan     · Currently resides in locked dementia unit as has history elopement  · At this time patient is calmer and somnolent likely due to bacteremia and no need for one-to-one at this point, however, will watch closely should need for one-to-one observation arise  · Continue dementia medications per home regimen     HTN (hypertension), benign   Assessment & Plan     · Blood pressure is in acceptable range for age especially given history of CKD will avoid drastic decrease in blood pressure  · Continue current medication regimen  · Monitor BMP       Normocytic anemia   Assessment & Plan     · Mild and stable, likely due to chronic disease  · Continue to monitor CBC daily       The patient will continue to require additional inpatient hospital stay due to need for close monitroing and IV antibiotics    Discharge Plan:    Return to [place]

## 2017-11-28 NOTE — ASSESSMENT & PLAN NOTE
Normocytic anemia in the setting of chronic kidney disease, hemoglobin 8 6 today has been from 9 510 7 over last several days no active signs or symptoms of bleeding   Will continue to monitor

## 2017-11-29 LAB
ANION GAP SERPL CALCULATED.3IONS-SCNC: 9 MMOL/L (ref 4–13)
BUN SERPL-MCNC: 27 MG/DL (ref 5–25)
CALCIUM SERPL-MCNC: 7.8 MG/DL (ref 8.3–10.1)
CHLORIDE SERPL-SCNC: 109 MMOL/L (ref 100–108)
CO2 SERPL-SCNC: 22 MMOL/L (ref 21–32)
CREAT SERPL-MCNC: 1.49 MG/DL (ref 0.6–1.3)
GFR SERPL CREATININE-BSD FRML MDRD: 40 ML/MIN/1.73SQ M
GLUCOSE SERPL-MCNC: 91 MG/DL (ref 65–140)
HCT VFR BLD AUTO: 26.1 % (ref 36.5–49.3)
HGB BLD-MCNC: 9 G/DL (ref 12–17)
POTASSIUM SERPL-SCNC: 3.6 MMOL/L (ref 3.5–5.3)
SODIUM SERPL-SCNC: 140 MMOL/L (ref 136–145)

## 2017-11-29 PROCEDURE — 85018 HEMOGLOBIN: CPT | Performed by: PHYSICIAN ASSISTANT

## 2017-11-29 PROCEDURE — 97110 THERAPEUTIC EXERCISES: CPT

## 2017-11-29 PROCEDURE — 97535 SELF CARE MNGMENT TRAINING: CPT

## 2017-11-29 PROCEDURE — 97530 THERAPEUTIC ACTIVITIES: CPT

## 2017-11-29 PROCEDURE — 97532 HB COGNITIVE SKILLS DEVELOPMENT: CPT

## 2017-11-29 PROCEDURE — 85014 HEMATOCRIT: CPT | Performed by: PHYSICIAN ASSISTANT

## 2017-11-29 PROCEDURE — 80048 BASIC METABOLIC PNL TOTAL CA: CPT | Performed by: PHYSICIAN ASSISTANT

## 2017-11-29 PROCEDURE — 87040 BLOOD CULTURE FOR BACTERIA: CPT | Performed by: INTERNAL MEDICINE

## 2017-11-29 RX ADMIN — MEMANTINE 10 MG: 5 TABLET ORAL at 18:50

## 2017-11-29 RX ADMIN — NYSTATIN: 100000 POWDER TOPICAL at 18:50

## 2017-11-29 RX ADMIN — LEVOTHYROXINE SODIUM 125 MCG: 125 TABLET ORAL at 06:15

## 2017-11-29 RX ADMIN — CEFAZOLIN SODIUM 2000 MG: 2 SOLUTION INTRAVENOUS at 10:13

## 2017-11-29 RX ADMIN — NYSTATIN 1 APPLICATION: 100000 POWDER TOPICAL at 09:33

## 2017-11-29 RX ADMIN — DONEPEZIL HYDROCHLORIDE 5 MG: 5 TABLET, FILM COATED ORAL at 09:33

## 2017-11-29 RX ADMIN — PRAVASTATIN SODIUM 40 MG: 40 TABLET ORAL at 15:55

## 2017-11-29 RX ADMIN — HEPARIN SODIUM 5000 UNITS: 5000 INJECTION, SOLUTION INTRAVENOUS; SUBCUTANEOUS at 13:15

## 2017-11-29 RX ADMIN — CEFAZOLIN SODIUM 2000 MG: 2 SOLUTION INTRAVENOUS at 18:50

## 2017-11-29 RX ADMIN — CEFAZOLIN SODIUM 2000 MG: 2 SOLUTION INTRAVENOUS at 03:33

## 2017-11-29 RX ADMIN — AMLODIPINE BESYLATE 10 MG: 10 TABLET ORAL at 09:33

## 2017-11-29 RX ADMIN — HEPARIN SODIUM 5000 UNITS: 5000 INJECTION, SOLUTION INTRAVENOUS; SUBCUTANEOUS at 06:35

## 2017-11-29 RX ADMIN — MEMANTINE 10 MG: 5 TABLET ORAL at 10:00

## 2017-11-29 RX ADMIN — DONEPEZIL HYDROCHLORIDE 5 MG: 5 TABLET, FILM COATED ORAL at 18:50

## 2017-11-29 RX ADMIN — HEPARIN SODIUM 5000 UNITS: 5000 INJECTION, SOLUTION INTRAVENOUS; SUBCUTANEOUS at 22:39

## 2017-11-29 NOTE — SOCIAL WORK
CM sent the referral to [facility] to confirm bed is being held for discharge placement

## 2017-11-29 NOTE — PHYSICAL THERAPY NOTE
Physical Therapy Progress Note     11/29/17 1344   Pain Assessment   Pain Assessment FLACC   Pain Rating: FLACC (Rest) - Face 0   Pain Rating: FLACC (Rest) - Legs 0   Pain Rating: FLACC (Rest) - Activity 0   Pain Rating: FLACC (Rest) - Cry 0   Pain Rating: FLACC (Rest) - Consolability 0   Score: FLACC (Rest) 0   Pain Rating: FLACC (Activity) - Face 1   Pain Rating: FLACC (Activity) - Legs 1   Pain Rating: FLACC (Activity) - Activity 1   Pain Rating: FLACC (Activity) - Cry 1   Pain Rating: FLACC (Activity) - Consolability 1   Score: FLACC (Activity) 5   Restrictions/Precautions   Weight Bearing Precautions Per Order No   Other Precautions Fall Risk;Pain;Cognitive; Bed Alarm   General   Chart Reviewed Yes   Response to Previous Treatment Patient unable to report, no changes reported from family or staff   Family/Caregiver Present No   Subjective   Subjective None stated  Bed Mobility   Rolling R 2  Maximal assistance   Additional items Assist x 2;Bedrails; Increased time required;LE management;Verbal cues   Rolling L 2  Maximal assistance   Additional items Assist x 2;Bedrails; Increased time required;Verbal cues;LE management   Activity Tolerance   Activity Tolerance Other (Comment)  (cognition)   Nurse Made Aware Yes   Exercises   TKR Supine;10 reps;AAROM; Bilateral   Assessment   Prognosis Fair   Problem List Decreased strength;Decreased range of motion;Decreased endurance; Impaired balance;Decreased mobility; Decreased cognition; Impaired judgement;Decreased safety awareness   Assessment Pt  supine in bed upon my arrival  Performance of HEP supine in bed with A required from therapist for completion  Pt  noted to be incontinent, required max A of 2 for pericare/brief change  Pt  noted to be resistive to movement and positioning himself on his back numerous times with cues provided by therapist for attempts of rolling unsucessful   Due to resistance towards therapist to movement not appropriate for OOB mobility at this time  Repositioned supine in bed with alarm active at end of treatment session  PT will continue to recommend STR upon d/c for continued improvement of noted impairments above  Barriers to Discharge None   Goals   Patient Goals None stated  STG Expiration Date 12/05/17   Treatment Day 2   Plan   Treatment/Interventions Functional transfer training;LE strengthening/ROM; Therapeutic exercise; Endurance training;Bed mobility;Spoke to nursing;Spoke to case management;OT   Progress Slow progress, cognitive deficits   PT Frequency 5x/wk   Recommendation   Recommendation Short-term skilled PT   PT - OK to Discharge Yes  (if d/c to STR when medically stable )     Mela Piña, PTA

## 2017-11-29 NOTE — PLAN OF CARE
Problem: PHYSICAL THERAPY ADULT  Goal: Performs mobility at highest level of function for planned discharge setting  See evaluation for individualized goals  Treatment/Interventions: Functional transfer training, LE strengthening/ROM, Therapeutic exercise, Endurance training, Patient/family training, Bed mobility, Gait training, Equipment eval/education, Spoke to nursing          See flowsheet documentation for full assessment, interventions and recommendations  Outcome: Progressing  Prognosis: Fair  Problem List: Decreased strength, Decreased range of motion, Decreased endurance, Impaired balance, Decreased mobility, Decreased cognition, Impaired judgement, Decreased safety awareness  Assessment: Pt  supine in bed upon my arrival  Performance of HEP supine in bed with A required from therapist for completion  Pt  noted to be incontinent, required max A of 2 for pericare/brief change  Pt  noted to be resistive to movement and positioning himself on his back numerous times with cues provided by therapist for attempts of rolling unsucessful  Due to resistance towards therapist to movement not appropriate for OOB mobility at this time  Repositioned supine in bed with alarm active at end of treatment session  PT will continue to recommend STR upon d/c for continued improvement of noted impairments above  Barriers to Discharge: None     Recommendation: Short-term skilled PT     PT - OK to Discharge: Yes (if d/c to STR when medically stable )    See flowsheet documentation for full assessment

## 2017-11-29 NOTE — OCCUPATIONAL THERAPY NOTE
Occupational Therapy Treatment Note:         11/29/17 1343   Restrictions/Precautions   Weight Bearing Precautions Per Order No   Other Precautions Fall Risk;Pain;Cognitive   Pain Assessment   Pain Assessment FLACC   Pain Score 5   Diversional Activities Other (Comment)  (reassurance and emotional support with gentle touch)   Pain Rating: FLACC (Rest) - Face 0   Pain Rating: FLACC (Rest) - Legs 0   Pain Rating: FLACC (Rest) - Activity 0   Pain Rating: FLACC (Rest) - Cry 0   Pain Rating: FLACC (Rest) - Consolability 0   Score: FLACC (Rest) 0   Pain Rating: FLACC (Activity) - Face 1   Pain Rating: FLACC (Activity) - Legs 1   Pain Rating: FLACC (Activity) - Activity 1   Pain Rating: FLACC (Activity) - Cry 1   Pain Rating: FLACC (Activity) - Consolability 1   Score: FLACC (Activity) 5   ADL   Where Assessed Supine, bed   Grooming Assistance Unable to assess   LB Bathing Assistance 1  Total Assistance   LB Bathing Deficit Perineal area; Buttocks;Right lower leg including foot; Left lower leg including foot   LB Bathing Comments Pt with resistive movements noted with attempts to provided monroe care  Extended time required to complete rolling due to rigid posturing noted  LB Dressing Assistance 1  Total Assistance   LB Dressing Deficit Don/doff R sock; Don/doff L sock   LB Dressing Comments Pt with loss of focus to tasks, flat afffect and minimal communication noted  Toileting Assistance  (Pt incontient of bladder this tx session  )   Functional Standing Tolerance   Comments unable to assess  Bed Mobility   Rolling R 2  Maximal assistance   Additional items Assist x 2;Bedrails; Increased time required;LE management;Verbal cues   Rolling L 2  Maximal assistance   Additional items Assist x 2;Bedrails; Increased time required;Verbal cues;LE management   Supine to Sit Unable to assess   Sit to Supine Unable to assess   Additional Comments Pt with tactile sensitivity warranting need for extended time to complete movements  Increased fatigue with eyes closed on and off through out tx session  Transfers   Sit to Stand Unable to assess   Cognition   Overall Cognitive Status Impaired   Arousal/Participation Lethargic   Attention Difficulty attending to directions   Orientation Level Oriented to person   Memory Decreased short term memory;Decreased recall of recent events;Decreased recall of precautions   Following Commands Follows one step commands with increased time or repetition   Comments Limited focus to tasks  Pt unable to follow commands without hand over hand initiation provided  Cognition Assessment Tools Other (Comment)  (functional observation  )   Additional Activities   Additional Activities Other (Comment)  (reviewed basid orientation  )   Additional Activities Comments Pt communicated only his name with inquiries and attempts to orient  Activity Tolerance   Activity Tolerance Patient limited by fatigue;Patient limited by pain   Medical Staff Made Aware reported all findings to nursing staff  Assessment   Assessment Pt was seen for skilled OT with focus on completion of self care tasks, bed mobility and basic orientation  Pt with limited focus to tasks  Unable to redirect with noted chewing of meat from lunch tray for more than 10 mins  Forwarded information to nursing staff  Pt with limited communication  Rigid movements noted in responde to bed mobility for staff to A with monroe care  Pt incontinent of bladder this tx session  Pt will benefit from further rehab with focus on achieving highest levels of independence with all functional tasks  Plan   Treatment Interventions ADL retraining;Functional transfer training; Endurance training;Cognitive reorientation   Goal Expiration Date 12/07/17   Treatment Day 1   OT Frequency 3-5x/wk   Recommendation   OT Discharge Recommendation Short Term Rehab   Dionte Flores

## 2017-11-29 NOTE — PROGRESS NOTES
[facility] Progress Note  Patient: Mildred Aburto 80 y o  male   MRN: [id number]  PCP: Deepti Stephens MD  Unit/Bed#: [facility] 68 2 - Encounter: [id number]  Date Of Visit: 17    Assessment:    Principal Problem:    Staphylococcus aureus septicemia ([place] [address] )  Active Problems:    GERD (gastroesophageal reflux disease)    Normocytic anemia    Hypothyroid    HTN (hypertension), benign    Dementia    Ambulatory dysfunction    Hyperlipidemia    Acute renal failure superimposed on stage 3 chronic kidney disease ([facility] )    Hypokalemia      Plan:    · MSSA bacteremia: pt to c/w Ancef and will need at least a 4 week course per Infectious Disease  · Repeat blood cultures are pending  · VSS: T 97 1F, pulse 57, resp 20, /56, SpO2 95% on room air  · WBC 5 88 will continue to monitor  · Hg 9, will continue to monitor  · Source of septicemia is unclear, family defers GABE  · Cr 1 49 this AM  · Will continue to monitor potassium, labs for today pending      VTE Pharmacologic Prophylaxis:   Pharmacologic: Heparin  Mechanical VTE Prophylaxis in Place: Yes    Patient Centered Rounds: I have performed bedside rounds with nursing staff today  Time Spent for Care: 15 minutes  More than 50% of total time spent on counseling and coordination of care as described above  Current Length of Stay: 5 day(s)    Current Patient Status: Inpatient   Certification Statement: The patient will continue to require additional inpatient hospital stay due to IV antibiotics and further testing    Discharge Plan / Estimated Discharge Date: TBD    Code Status: Level 3 - DNAR and DNI      Subjective:   Pt seen at bedside  He was able to eat some breakfast with assistance  He does not appear to be in any acute distress       Objective:     Vitals:   Temp (24hrs), Av °F (36 7 °C), Min:97 1 °F (36 2 °C), Max:99 5 °F (37 5 °C)    HR:  [57-82] 57  Resp:  [18-21] 20  BP: (136-141)/(56-69) 137/56  SpO2:  [95 %-96 %] 95 %  Body mass index is 32 42 kg/m²  Input and Output Summary (last 24 hours):     No intake or output data in the 24 hours ending 11/29/17 1225    Physical Exam:     Physical Exam    General: well-developed, no acute distress, non-tachypnic, non-dyspnic  Head: normocephalic, atraumatic  Neck:supple, no lymphadenopathy  Heart: regular rate and rhythm  Lungs: clear to auscultation b/l, in no respiratory distress  Abdomen: soft, does not seem to be any tenderness on palpation  Extremities: no edema noted  Neuro: awake, not oriented, is able to follow commands      Additional Data:     Labs:      Results from last 7 days  Lab Units 11/29/17  0824 11/28/17  0631   WBC Thousand/uL  --  5 88   HEMOGLOBIN g/dL 9 0* 8 6*   HEMATOCRIT % 26 1* 25 4*   PLATELETS Thousands/uL  --  188   NEUTROS PCT %  --  57   LYMPHS PCT %  --  25   MONOS PCT %  --  14*   EOS PCT %  --  4       Results from last 7 days  Lab Units 11/29/17  0824  11/27/17  1654   SODIUM mmol/L 140  < > 143   POTASSIUM mmol/L 3 6  < > 3 7   CHLORIDE mmol/L 109*  < > 108   CO2 mmol/L 22  < > 23   BUN mg/dL 27*  < > 28*   CREATININE mg/dL 1 49*  < > 1 66*   CALCIUM mg/dL 7 8*  < > 8 2*   TOTAL PROTEIN g/dL  --   --  6 3*   BILIRUBIN TOTAL mg/dL  --   --  0 40   ALK PHOS U/L  --   --  57   ALT U/L  --   --  27   AST U/L  --   --  26   GLUCOSE RANDOM mg/dL 91  < > 112   < > = values in this interval not displayed  Results from last 7 days  Lab Units 11/24/17  1202   INR  1 09       * I Have Reviewed All Lab Data Listed Above  * Additional Pertinent Lab Tests Reviewed: All Labs Within Last 24 Hours Reviewed        Recent Cultures (last 7 days):       Results from last 7 days  Lab Units 11/25/17  1825 11/24/17  1203 11/24/17  1202   BLOOD CULTURE    Staphylococcus aureus*  No Growth at 72 hrs     Staphylococcus aureus*   Staphylococcus aureus*   GRAM STAIN RESULT  Gram positive cocci in clusters Gram positive cocci in clusters Gram positive cocci in clusters       Last 24 Hours Medication List:     amLODIPine 10 mg Oral Daily   calcitriol 0 25 mcg Oral Every Other Day   cefazolin 2,000 mg Intravenous Q8H   memantine 10 mg Oral BID   And      donepezil 5 mg Oral BID   heparin (porcine) 5,000 Units Subcutaneous Q8H [facility]   levothyroxine 125 mcg Oral Daily   nystatin  Topical BID   pravastatin 40 mg Oral Daily With Dinner        Today, Patient Was Seen By: Luster Apgar, DPM    ** Please Note: This note has been constructed using a voice recognition system   **

## 2017-11-29 NOTE — PLAN OF CARE
Problem: OCCUPATIONAL THERAPY ADULT  Goal: Performs self-care activities at highest level of function for planned discharge setting  See evaluation for individualized goals  Treatment Interventions: ADL retraining, Functional transfer training, UE strengthening/ROM, Endurance training, Cognitive reorientation, Patient/family training, Compensatory technique education, Energy conservation, Activityengagement, Equipment evaluation/education          See flowsheet documentation for full assessment, interventions and recommendations  Outcome: Progressing  Limitation: Decreased ADL status, Decreased UE strength, Decreased endurance, Decreased cognition, Decreased Safe judgement during ADL, Decreased self-care trans, Decreased high-level ADLs  Prognosis: Fair  Assessment: Pt was seen for skilled OT with focus on completion of self care tasks, bed mobility and basic orientation  Pt with limited focus to tasks  Unable to redirect with noted chewing of meat from lunch tray for more than 10 mins  Forwarded information to nursing staff  Pt with limited communication  Rigid movements noted in responde to bed mobility for staff to A with monroe care  Pt incontinent of bladder this tx session  Pt will benefit from further rehab with focus on achieving highest levels of independence with all functional tasks        OT Discharge Recommendation: Short Term Rehab         Comments: Alondra Truong, [address]

## 2017-11-29 NOTE — PROGRESS NOTES
Progress Note - Infectious Disease   Mildred Aburto 80 y o  male MRN: [id number]  Unit/Bed#: [facility] 2 -02 Encounter: [id number]      Impression/Recommendations:  1  Sepsis, POA   Fevers, tachycardia   Secondary to #2  Tachycardia improving   Fevers trending down as well  Otherwise, patient is nontoxic appearing and hemodynamically stable      -  Antibiotics as below  - monitor WBC count and temperatures closely  - monitor hemodynamics     2   MSSA bacteremia  Unclear source at this point   Patient did have a recent hospitalization for KULWINDER and received IV fluids, but no other recent interventions   CT abdomen and pelvis negative for any acute disease   Showed a patchy consolidation in the right lower lobe which is more likely atelectasis as patient not having any respiratory symptoms    Reviewed patient's medical history and do not see any history of hardware in his body, and he denies any as well    No skin lesions or wounds  No joint abnormalities on exam or complaints  TTE negative for any obvious vegetation  Repeat blood culture from 11/25 again positive for Staph aureus in 1 of 2 sets  Ideally, patient would need a transesophageal echocardiogram to definitively rule out a vegetation, but family refused      -   Continue with cefazolin  Patient will probably need a 6 week course since we cannot do a GABE to definitively rule out endocarditis and 1 set from repeat blood cultures is now positive for Staph aureus again     -  Send 2 more sets of blood cultures today  -  Follow-up positive blood culture from 11/25     3  KULWINDER on CKD   Creatinine elevated at 2 1  In the setting of poor p o  Intake and #2   Creatinine continues to improve      - fluid management per primary team  - monitor creatinine     4  Right lower lobe opacity   I suspect that this is more likely atelectasis rather than pneumonia  The patient did not present with any significant respiratory symptoms and O2 saturations are stable      -  Monitor respiratory status closely  - Monitor for any new respiratory symptoms     4  Advanced dementia  Patient lives in a locked dementia unit      5  History of gout  No clinical signs to suggest acute gout on physical exam   -  Serial exams        Antibiotics:  Cefazolin D2  Abx D6    Subjective:  Patient seen on AM rounds  Denies fevers, chills, or sweats  Denies nausea, vomiting, or diarrhea  Objective:  Vitals:  HR:  [57-82] 57  Resp:  [18-21] 20  BP: (136-141)/(56-69) 137/56  SpO2:  [95 %-96 %] 95 %  Temp (24hrs), Av °F (36 7 °C), Min:97 1 °F (36 2 °C), Max:99 5 °F (37 5 °C)  Current: Temperature: (!) 97 1 °F (36 2 °C)    Physical Exam:   General:  No acute distress  Psychiatric:  Awake and alert  Pulmonary:  Normal respiratory excursion without accessory muscle use  Abdomen:  Soft, nontender  Extremities:  No edema  Skin:  No rashes    Lab Results:  I have personally reviewed pertinent labs      Results from last 7 days  Lab Units 17  0617  1654 17  0537 17  1202   SODIUM mmol/L 140 143 143 142 138   POTASSIUM mmol/L 3 6 3 4* 3 7 3 7 3 9   CHLORIDE mmol/L 109* 111* 108 111* 104   CO2 mmol/L 22 23 23 22 26   ANION GAP mmol/L 9 9 12 9 8   BUN mg/dL 27* 29* 28* 31* 34*   CREATININE mg/dL 1 49* 1 54* 1 66* 1 58* 2 14*   EGFR ml/min/1 73sq m 40 38 35 37 26   GLUCOSE RANDOM mg/dL 91 95 112 105 134   CALCIUM mg/dL 7 8* 7 6* 8 2* 8 1* 8 7   AST U/L  --   --  26 30 31   ALT U/L  --   --  27 26 27   ALK PHOS U/L  --   --  57 52 64   TOTAL PROTEIN g/dL  --   --  6 3* 5 7* 7 0   ALBUMIN g/dL  --   --  2 6* 2 3* 3 2*   BILIRUBIN TOTAL mg/dL  --   --  0 40 0 49 0 83       Results from last 7 days  Lab Units 17  0817  0631 17  1654 17  0537   WBC Thousand/uL  --  5 88 6 29 6 52   HEMOGLOBIN g/dL 9 0* 8 6* 10 5* 9 7*   PLATELETS Thousands/uL  --  188 226 159       Results from last 7 days  Lab Units 17  1825 17  2048 17  1203 11/24/17  1202   BLOOD CULTURE    Staphylococcus aureus*  No Growth at 72 hrs   --    Staphylococcus aureus*   Staphylococcus aureus*   GRAM STAIN RESULT  Gram positive cocci in clusters  --  Gram positive cocci in clusters Gram positive cocci in clusters   MRSA CULTURE ONLY   --  No Methicillin Resistant Staphlyococcus aureus (MRSA) isolated  --   --        Imaging Studies:   I have personally reviewed pertinent imaging study reports and images in PACS  EKG, Pathology, and Other Studies:   I have personally reviewed pertinent reports

## 2017-11-30 LAB
ANION GAP SERPL CALCULATED.3IONS-SCNC: 6 MMOL/L (ref 4–13)
BACTERIA BLD CULT: ABNORMAL
BACTERIA BLD CULT: NORMAL
BASOPHILS # BLD AUTO: 0.04 THOUSANDS/ΜL (ref 0–0.1)
BASOPHILS NFR BLD AUTO: 1 % (ref 0–1)
BUN SERPL-MCNC: 21 MG/DL (ref 5–25)
CALCIUM SERPL-MCNC: 7.6 MG/DL (ref 8.3–10.1)
CHLORIDE SERPL-SCNC: 108 MMOL/L (ref 100–108)
CO2 SERPL-SCNC: 24 MMOL/L (ref 21–32)
CREAT SERPL-MCNC: 1.41 MG/DL (ref 0.6–1.3)
EOSINOPHIL # BLD AUTO: 0.3 THOUSAND/ΜL (ref 0–0.61)
EOSINOPHIL NFR BLD AUTO: 5 % (ref 0–6)
ERYTHROCYTE [DISTWIDTH] IN BLOOD BY AUTOMATED COUNT: 13.5 % (ref 11.6–15.1)
FERRITIN SERPL-MCNC: 194 NG/ML (ref 8–388)
FOLATE SERPL-MCNC: 4 NG/ML (ref 3.1–17.5)
GFR SERPL CREATININE-BSD FRML MDRD: 42 ML/MIN/1.73SQ M
GLUCOSE SERPL-MCNC: 84 MG/DL (ref 65–140)
GRAM STN SPEC: ABNORMAL
HCT VFR BLD AUTO: 25.2 % (ref 36.5–49.3)
HGB BLD-MCNC: 8.6 G/DL (ref 12–17)
IRON SATN MFR SERPL: 16 %
IRON SERPL-MCNC: 35 UG/DL (ref 65–175)
LYMPHOCYTES # BLD AUTO: 1.6 THOUSANDS/ΜL (ref 0.6–4.47)
LYMPHOCYTES NFR BLD AUTO: 26 % (ref 14–44)
MCH RBC QN AUTO: 30.9 PG (ref 26.8–34.3)
MCHC RBC AUTO-ENTMCNC: 34.1 G/DL (ref 31.4–37.4)
MCV RBC AUTO: 91 FL (ref 82–98)
MONOCYTES # BLD AUTO: 0.72 THOUSAND/ΜL (ref 0.17–1.22)
MONOCYTES NFR BLD AUTO: 12 % (ref 4–12)
NEUTROPHILS # BLD AUTO: 3.47 THOUSANDS/ΜL (ref 1.85–7.62)
NEUTS SEG NFR BLD AUTO: 56 % (ref 43–75)
NRBC BLD AUTO-RTO: 0 /100 WBCS
PLATELET # BLD AUTO: 256 THOUSANDS/UL (ref 149–390)
PMV BLD AUTO: 10.9 FL (ref 8.9–12.7)
POTASSIUM SERPL-SCNC: 3.6 MMOL/L (ref 3.5–5.3)
RBC # BLD AUTO: 2.78 MILLION/UL (ref 3.88–5.62)
SODIUM SERPL-SCNC: 138 MMOL/L (ref 136–145)
TIBC SERPL-MCNC: 217 UG/DL (ref 250–450)
VIT B12 SERPL-MCNC: 265 PG/ML (ref 100–900)
WBC # BLD AUTO: 6.13 THOUSAND/UL (ref 4.31–10.16)

## 2017-11-30 PROCEDURE — 80048 BASIC METABOLIC PNL TOTAL CA: CPT | Performed by: FAMILY MEDICINE

## 2017-11-30 PROCEDURE — 83550 IRON BINDING TEST: CPT | Performed by: FAMILY MEDICINE

## 2017-11-30 PROCEDURE — 83540 ASSAY OF IRON: CPT | Performed by: FAMILY MEDICINE

## 2017-11-30 PROCEDURE — 82728 ASSAY OF FERRITIN: CPT | Performed by: FAMILY MEDICINE

## 2017-11-30 PROCEDURE — 85025 COMPLETE CBC W/AUTO DIFF WBC: CPT | Performed by: FAMILY MEDICINE

## 2017-11-30 PROCEDURE — 82607 VITAMIN B-12: CPT | Performed by: FAMILY MEDICINE

## 2017-11-30 PROCEDURE — 82746 ASSAY OF FOLIC ACID SERUM: CPT | Performed by: FAMILY MEDICINE

## 2017-11-30 RX ORDER — MORPHINE SULFATE 2 MG/ML
1 INJECTION, SOLUTION INTRAMUSCULAR; INTRAVENOUS EVERY 4 HOURS PRN
Status: DISCONTINUED | OUTPATIENT
Start: 2017-11-30 | End: 2017-12-02 | Stop reason: HOSPADM

## 2017-11-30 RX ADMIN — CEFAZOLIN SODIUM 2000 MG: 2 SOLUTION INTRAVENOUS at 18:03

## 2017-11-30 RX ADMIN — AMLODIPINE BESYLATE 10 MG: 10 TABLET ORAL at 09:03

## 2017-11-30 RX ADMIN — MEMANTINE 10 MG: 5 TABLET ORAL at 09:03

## 2017-11-30 RX ADMIN — CEFAZOLIN SODIUM 2000 MG: 2 SOLUTION INTRAVENOUS at 02:18

## 2017-11-30 RX ADMIN — HEPARIN SODIUM 5000 UNITS: 5000 INJECTION, SOLUTION INTRAVENOUS; SUBCUTANEOUS at 22:18

## 2017-11-30 RX ADMIN — LEVOTHYROXINE SODIUM 125 MCG: 125 TABLET ORAL at 05:00

## 2017-11-30 RX ADMIN — DONEPEZIL HYDROCHLORIDE 5 MG: 5 TABLET, FILM COATED ORAL at 09:03

## 2017-11-30 RX ADMIN — MEMANTINE 10 MG: 5 TABLET ORAL at 17:13

## 2017-11-30 RX ADMIN — HEPARIN SODIUM 5000 UNITS: 5000 INJECTION, SOLUTION INTRAVENOUS; SUBCUTANEOUS at 05:00

## 2017-11-30 RX ADMIN — CALCITRIOL 0.25 MCG: 0.25 CAPSULE, LIQUID FILLED ORAL at 09:03

## 2017-11-30 RX ADMIN — PRAVASTATIN SODIUM 40 MG: 40 TABLET ORAL at 17:13

## 2017-11-30 RX ADMIN — CEFAZOLIN SODIUM 2000 MG: 2 SOLUTION INTRAVENOUS at 10:15

## 2017-11-30 RX ADMIN — HEPARIN SODIUM 5000 UNITS: 5000 INJECTION, SOLUTION INTRAVENOUS; SUBCUTANEOUS at 14:58

## 2017-11-30 RX ADMIN — NYSTATIN 1 APPLICATION: 100000 POWDER TOPICAL at 09:03

## 2017-11-30 RX ADMIN — NYSTATIN 1 APPLICATION: 100000 POWDER TOPICAL at 17:17

## 2017-11-30 RX ADMIN — DONEPEZIL HYDROCHLORIDE 5 MG: 5 TABLET, FILM COATED ORAL at 17:13

## 2017-11-30 NOTE — PROGRESS NOTES
[facility] Progress Note  Patient: Richard Conception 80 y o  male   MRN: [id number]  PCP: Lorri Deutsch MD  Unit/Bed#: [facility] Encounter: [id number]  Date Of Visit: 17    Assessment:    Principal Problem:    Staphylococcus aureus septicemia ([place] [address] )  Active Problems:    GERD (gastroesophageal reflux disease)    Normocytic anemia    Hypothyroid    HTN (hypertension), benign    Dementia    Ambulatory dysfunction    Hyperlipidemia    Acute renal failure superimposed on stage 3 chronic kidney disease ([place] [address] )    Hypokalemia      Plan:    · Repeat blood cultures taken yesterday pending, will re-evaluate for MSSA bacteremia; sepsis resolved  · Per ID will continue Ancef and will likely need 6 week course since we cannot do GABE to r/u endocarditis  · Cr continues to improve, is 1 41 today  · WBC:6 13  · Tachycardia resolved, VSS: T 96 7F, pulse 55, resp 16, /62 SpO2 98 % on room air   · Heme occult pending, Hg stable will re-check CBC in AM     VTE Pharmacologic Prophylaxis:   Pharmacologic: Heparin  Mechanical VTE Prophylaxis in Place: Yes    Patient Centered Rounds: I have performed bedside rounds with nursing staff today  Time Spent for Care: 15 minutes  More than 50% of total time spent on counseling and coordination of care as described above  Current Length of Stay: 6 day(s)    Current Patient Status: Inpatient   Certification Statement: The patient will continue to require additional inpatient hospital stay due to IV abx and repeat blood cultures for bacteremia    Discharge Plan / Estimated Discharge Date: TBD    Code Status: Level 3 - DNAR and DNI      Subjective:   Pt seen at bedside  He is awake and alert and has eaten his breakfast  He denies any pain  He appears to be resting comfortably      Objective:     Vitals:   Temp (24hrs), Av °F (36 1 °C), Min:96 6 °F (35 9 °C), Max:97 6 °F (36 4 °C)    HR:  [55-60] 55  Resp:  [12-20] 16  BP: (137-159)/(62-93) 137/62  SpO2:  [95 %-98 %] 98 %  Body mass index is 32 42 kg/m²  Input and Output Summary (last 24 hours):     No intake or output data in the 24 hours ending 11/30/17 1000    Physical Exam:     Physical Exam  General: awake, well-developed, in no acute distress  Head:normocephalic, atraumatic  Neck: supple, no lymphadenopathy  Heart: distant heart sounds, regular rate and rhythm  Lungs: clear to auscultation b/l  Abdomen, soft, non-tender  Extremities: no calf tenderness to palpation b/l  Skin: warm and dry  Neuro: awake alert, not oriented to place and time however is able to follow command and squeeze with  both hands    Additional Data:     Labs:      Results from last 7 days  Lab Units 11/30/17  0458   WBC Thousand/uL 6 13   HEMOGLOBIN g/dL 8 6*   HEMATOCRIT % 25 2*   PLATELETS Thousands/uL 256   NEUTROS PCT % 56   LYMPHS PCT % 26   MONOS PCT % 12   EOS PCT % 5       Results from last 7 days  Lab Units 11/30/17  0458  11/27/17  1654   SODIUM mmol/L 138  < > 143   POTASSIUM mmol/L 3 6  < > 3 7   CHLORIDE mmol/L 108  < > 108   CO2 mmol/L 24  < > 23   BUN mg/dL 21  < > 28*   CREATININE mg/dL 1 41*  < > 1 66*   CALCIUM mg/dL 7 6*  < > 8 2*   TOTAL PROTEIN g/dL  --   --  6 3*   BILIRUBIN TOTAL mg/dL  --   --  0 40   ALK PHOS U/L  --   --  57   ALT U/L  --   --  27   AST U/L  --   --  26   GLUCOSE RANDOM mg/dL 84  < > 112   < > = values in this interval not displayed  Results from last 7 days  Lab Units 11/24/17  1202   INR  1 09       * I Have Reviewed All Lab Data Listed Above  * Additional Pertinent Lab Tests Reviewed: All Labs Within Last 24 Hours Reviewed        Recent Cultures (last 7 days):       Results from last 7 days  Lab Units 11/25/17  1825 11/24/17  1203 11/24/17  1202   BLOOD CULTURE  No Growth After 4 Days    Staphylococcus aureus*   Staphylococcus aureus*   Staphylococcus aureus*   GRAM STAIN RESULT  Gram positive cocci in clusters Gram positive cocci in clusters Gram positive cocci in clusters       Last 24 Hours Medication List:     amLODIPine 10 mg Oral Daily   calcitriol 0 25 mcg Oral Every Other Day   cefazolin 2,000 mg Intravenous Q8H   memantine 10 mg Oral BID   And      donepezil 5 mg Oral BID   heparin (porcine) 5,000 Units Subcutaneous Q8H [address]   levothyroxine 125 mcg Oral Daily   nystatin  Topical BID   pravastatin 40 mg Oral Daily With Dinner        Today, Patient Was Seen By: Shanice Zhang DPM    ** Please Note: This note has been constructed using a voice recognition system   **

## 2017-11-30 NOTE — PROGRESS NOTES
Progress Note - Infectious Disease   Denece Dameon 80 y o  male MRN: [id number]  Unit/Bed#: [facility] 2 -02 Encounter: [id number]      Impression/Recommendations:  1  Sepsis, POA   Fevers, tachycardia   Secondary to #2  Tachycardia improved   Fevers trending improved  Otherwise, patient is nontoxic appearing and hemodynamically stable      -  Antibiotics as below  - monitor WBC count and temperatures closely  - monitor hemodynamics     2   MSSA bacteremia  Unclear source at this point   Patient did have a recent hospitalization for KULWINDER and received IV fluids, but no other recent interventions   CT abdomen and pelvis negative for any acute disease   Showed a patchy consolidation in the right lower lobe which is more likely atelectasis as patient not having any respiratory symptoms    Reviewed patient's medical history and do not see any history of hardware in his body, and he denies any as well    No skin lesions or wounds   No joint abnormalities on exam or complaints   TTE negative for any obvious vegetation  Repeat blood culture from 11/25 again positive for Staph aureus in 1 of 2 sets  Ideally, patient would need a transesophageal echocardiogram to definitively rule out a vegetation, but family refused      -   Continue with cefazolin  Patient will probably need a 6 week course since we cannot do a GABE to definitively rule out endocarditis and 1 set from repeat blood cultures is now positive for Staph aureus again   -    Follow-up blood cultures sent on 11/29 to ensure bacterial clearance   Hold off on placing PICC line for now  -  Follow-up positive blood culture from 11/25     3  KULWINDER on CKD   Creatinine elevated at 2 1  In the setting of poor p o  Intake and #2   Creatinine continues to improve      - fluid management per primary team  - monitor creatinine     4  Right lower lobe opacity   I suspect that this is more likely atelectasis rather than pneumonia  The patient did not present with any significant respiratory symptoms and O2 saturations are stable      -  Monitor respiratory status closely  - Monitor for any new respiratory symptoms     4  Advanced dementia  Patient lives in a locked dementia unit      5  History of gout  No clinical signs to suggest acute gout on physical exam   -  Serial exams        Antibiotics:  Cefazolin D3  Abx D7    Subjective:   Patient is resting comfortably in bed  He has no complaints  No fevers or chills  No chest pain  No joint complaints  Objective:  Vitals:  HR:  [55-60] 55  Resp:  [12-20] 16  BP: (137-159)/(62-93) 137/62  SpO2:  [95 %-98 %] 98 %  Temp (24hrs), Av °F (36 1 °C), Min:96 6 °F (35 9 °C), Max:97 6 °F (36 4 °C)  Current: Temperature: (!) 96 7 °F (35 9 °C)    Physical Exam:   General:  No acute distress  Psychiatric:  Awake and alert  Pulmonary:  Normal respiratory excursion without accessory muscle use  Abdomen:  Soft, nontender  Extremities:  No edema  Skin:  No rashes    Lab Results:  I have personally reviewed pertinent labs      Results from last 7 days  Lab Units 17  0458 17  0824 17  0631 17  1654 17  0537 17  1202   SODIUM mmol/L 138 140 143 143 142 138   POTASSIUM mmol/L 3 6 3 6 3 4* 3 7 3 7 3 9   CHLORIDE mmol/L 108 109* 111* 108 111* 104   CO2 mmol/L 24 22 23 23 22 26   ANION GAP mmol/L 6 9 9 12 9 8   BUN mg/dL 21 27* 29* 28* 31* 34*   CREATININE mg/dL 1 41* 1 49* 1 54* 1 66* 1 58* 2 14*   EGFR ml/min/1 73sq m 42 40 38 35 37 26   GLUCOSE RANDOM mg/dL 84 91 95 112 105 134   CALCIUM mg/dL 7 6* 7 8* 7 6* 8 2* 8 1* 8 7   AST U/L  --   --   --  26 30 31   ALT U/L  --   --   --  27 26 27   ALK PHOS U/L  --   --   --  57 52 64   TOTAL PROTEIN g/dL  --   --   --  6 3* 5 7* 7 0   ALBUMIN g/dL  --   --   --  2 6* 2 3* 3 2*   BILIRUBIN TOTAL mg/dL  --   --   --  0 40 0 49 0 83       Results from last 7 days  Lab Units 17  0458 17  0824 17  0631 17  1654   WBC Thousand/uL 6 13  --  5 88 6  29   HEMOGLOBIN g/dL 8 6* 9 0* 8 6* 10 5*   PLATELETS Thousands/uL 256  --  188 226       Results from last 7 days  Lab Units 11/25/17  1825 11/24/17  2048 11/24/17  1203 11/24/17  1202   BLOOD CULTURE  No Growth After 4 Days  Staphylococcus aureus*  --    Staphylococcus aureus*   Staphylococcus aureus*   GRAM STAIN RESULT  Gram positive cocci in clusters  --  Gram positive cocci in clusters Gram positive cocci in clusters   MRSA CULTURE ONLY   --  No Methicillin Resistant Staphlyococcus aureus (MRSA) isolated  --   --        Imaging Studies:   I have personally reviewed pertinent imaging study reports and images in PACS  EKG, Pathology, and Other Studies:   I have personally reviewed pertinent reports

## 2017-11-30 NOTE — PLAN OF CARE

## 2017-12-01 ENCOUNTER — APPOINTMENT (INPATIENT)
Dept: RADIOLOGY | Facility: HOSPITAL | Age: 82
DRG: 872 | End: 2017-12-01
Payer: MEDICARE

## 2017-12-01 ENCOUNTER — GENERIC CONVERSION - ENCOUNTER (OUTPATIENT)
Dept: OTHER | Facility: OTHER | Age: 82
End: 2017-12-01

## 2017-12-01 LAB
ANION GAP SERPL CALCULATED.3IONS-SCNC: 6 MMOL/L (ref 4–13)
BUN SERPL-MCNC: 17 MG/DL (ref 5–25)
CALCIUM SERPL-MCNC: 8.3 MG/DL (ref 8.3–10.1)
CHLORIDE SERPL-SCNC: 107 MMOL/L (ref 100–108)
CO2 SERPL-SCNC: 26 MMOL/L (ref 21–32)
CREAT SERPL-MCNC: 1.47 MG/DL (ref 0.6–1.3)
ERYTHROCYTE [DISTWIDTH] IN BLOOD BY AUTOMATED COUNT: 13.6 % (ref 11.6–15.1)
GFR SERPL CREATININE-BSD FRML MDRD: 40 ML/MIN/1.73SQ M
GLUCOSE SERPL-MCNC: 90 MG/DL (ref 65–140)
HCT VFR BLD AUTO: 27.9 % (ref 36.5–49.3)
HGB BLD-MCNC: 9.6 G/DL (ref 12–17)
MCH RBC QN AUTO: 31.3 PG (ref 26.8–34.3)
MCHC RBC AUTO-ENTMCNC: 34.4 G/DL (ref 31.4–37.4)
MCV RBC AUTO: 91 FL (ref 82–98)
PLATELET # BLD AUTO: 307 THOUSANDS/UL (ref 149–390)
PMV BLD AUTO: 10.5 FL (ref 8.9–12.7)
POTASSIUM SERPL-SCNC: 3.6 MMOL/L (ref 3.5–5.3)
RBC # BLD AUTO: 3.07 MILLION/UL (ref 3.88–5.62)
SODIUM SERPL-SCNC: 139 MMOL/L (ref 136–145)
WBC # BLD AUTO: 8.49 THOUSAND/UL (ref 4.31–10.16)

## 2017-12-01 PROCEDURE — C1751 CATH, INF, PER/CENT/MIDLINE: HCPCS

## 2017-12-01 PROCEDURE — 85027 COMPLETE CBC AUTOMATED: CPT | Performed by: STUDENT IN AN ORGANIZED HEALTH CARE EDUCATION/TRAINING PROGRAM

## 2017-12-01 PROCEDURE — 71010 HB CHEST X-RAY 1 VIEW FRONTAL: CPT

## 2017-12-01 PROCEDURE — 36569 INSJ PICC 5 YR+ W/O IMAGING: CPT

## 2017-12-01 PROCEDURE — 80048 BASIC METABOLIC PNL TOTAL CA: CPT | Performed by: STUDENT IN AN ORGANIZED HEALTH CARE EDUCATION/TRAINING PROGRAM

## 2017-12-01 PROCEDURE — 02HV33Z INSERTION OF INFUSION DEVICE INTO SUPERIOR VENA CAVA, PERCUTANEOUS APPROACH: ICD-10-PCS | Performed by: FAMILY MEDICINE

## 2017-12-01 RX ADMIN — CEFAZOLIN SODIUM 2000 MG: 2 SOLUTION INTRAVENOUS at 02:42

## 2017-12-01 RX ADMIN — HEPARIN SODIUM 5000 UNITS: 5000 INJECTION, SOLUTION INTRAVENOUS; SUBCUTANEOUS at 15:24

## 2017-12-01 RX ADMIN — NYSTATIN: 100000 POWDER TOPICAL at 17:41

## 2017-12-01 RX ADMIN — AMLODIPINE BESYLATE 10 MG: 10 TABLET ORAL at 11:43

## 2017-12-01 RX ADMIN — DONEPEZIL HYDROCHLORIDE 5 MG: 5 TABLET, FILM COATED ORAL at 11:43

## 2017-12-01 RX ADMIN — DONEPEZIL HYDROCHLORIDE 5 MG: 5 TABLET, FILM COATED ORAL at 17:41

## 2017-12-01 RX ADMIN — HEPARIN SODIUM 5000 UNITS: 5000 INJECTION, SOLUTION INTRAVENOUS; SUBCUTANEOUS at 22:12

## 2017-12-01 RX ADMIN — LEVOTHYROXINE SODIUM 125 MCG: 125 TABLET ORAL at 06:41

## 2017-12-01 RX ADMIN — PRAVASTATIN SODIUM 40 MG: 40 TABLET ORAL at 16:15

## 2017-12-01 RX ADMIN — NYSTATIN: 100000 POWDER TOPICAL at 11:44

## 2017-12-01 RX ADMIN — MEMANTINE 10 MG: 5 TABLET ORAL at 11:43

## 2017-12-01 RX ADMIN — MEMANTINE 10 MG: 5 TABLET ORAL at 17:41

## 2017-12-01 RX ADMIN — CEFAZOLIN SODIUM 2000 MG: 2 SOLUTION INTRAVENOUS at 17:40

## 2017-12-01 RX ADMIN — HEPARIN SODIUM 5000 UNITS: 5000 INJECTION, SOLUTION INTRAVENOUS; SUBCUTANEOUS at 06:40

## 2017-12-01 NOTE — PROGRESS NOTES
Progress Note - Infectious Disease   Mildred Aburto 80 y o  male MRN: [id number]  Unit/Bed#: [facility] 2 -02 Encounter: [id number]      Impression/Plan:  1  Sepsis, POA   Fevers, tachycardia   Secondary to #2  Tachycardia improved  The fever seems to have resolved Otherwise, patient is nontoxic appearing and hemodynamically stable  - Antibiotics as below  - monitor WBC count and temperatures closely  - monitor hemodynamics     2   MSSA bacteremia  Unclear source at this point despite extensive workup   Patient did have a recent hospitalization for KULWINDER and received IV fluids, but no other recent interventions   TTE negative for any obvious vegetation  Repeat blood culture from 11/25 again positive for Staph aureus in 1 of 2 sets  Ideally, patient would need a transesophageal echocardiogram to definitively rule out a vegetation, but family refused   -Continue with cefazolin through 1/4/2017  -follow up repeat blood cultures  -weekly CBC with diff and creatinine while patient is on cefazolin     3  KULWINDER on CKD   Creatinine elevated at 2 1  In the setting of poor p o  Intake and #2  Creatinine continues to improve  - fluid management per primary team  - monitor creatinine  -further dose adjust antibiotics as needed     4  Right lower lobe opacity   I suspect that this is more likely atelectasis rather than pneumonia  The patient did not present with any significant respiratory symptoms and O2 saturations are stable  - Monitor respiratory status closely  - Monitor for any new respiratory symptoms     5  Advanced dementia  Patient lives in a locked dementia unit      6  History of gout  No clinical signs to suggest acute gout on physical exam     7   Disposition-okay to to discharge to skilled nursing once the blood cultures are negative times 72 hours  Follow-up in the Infectious Disease office within 2 weeks  We will see the patient again 12/4/2017 if not discharged    Please call if questions  Antibiotics:  Cefazolin 4  Antibiotics 8    Subjective:  Patient has no fever, chills, sweats; no nausea, vomiting, diarrhea; no cough, shortness of breath; no pain  No new symptoms  Patient had PICC line placed today  Patient seems in good spirits but is confused  Objective:  Vitals:  HR:  [55-72] 64  Resp:  [16-18] 18  BP: (132-156)/(67-96) 132/96  SpO2:  [96 %-100 %] 96 %  Temp (24hrs), Av 9 °F (36 1 °C), Min:96 3 °F (35 7 °C), Max:97 6 °F (36 4 °C)  Current: Temperature: (!) 96 8 °F (36 °C)    Physical Exam:   General Appearance:  Alert, nontoxic, no acute distress  Throat: Oropharynx moist without lesions  Lungs:   Clear to auscultation bilaterally, no audible wheezes, rhonchi or rales; respirations unlabored   Heart:  RRR; no murmur, rub or gallop   Abdomen:   Soft, non-tender, non-distended, positive bowel sounds  Extremities: No clubbing, cyanosis or edema   Skin: No new rashes or lesions  No draining wounds noted         Labs, Imaging, & Other studies:   All pertinent labs and imaging studies were personally reviewed    Results from last 7 days  Lab Units 17  07178 17  0824 17  0631   WBC Thousand/uL 8 49 6 13  --  5 88   HEMOGLOBIN g/dL 9 6* 8 6* 9 0* 8 6*   PLATELETS Thousands/uL 307 256  --  188       Results from last 7 days  Lab Units 17  0723 17  0458 17  0824  17  1654 17  0537   SODIUM mmol/L 139 138 140  < > 143 142   POTASSIUM mmol/L 3 6 3 6 3 6  < > 3 7 3 7   CHLORIDE mmol/L 107 108 109*  < > 108 111*   CO2 mmol/L 26 24 22  < > 23 22   ANION GAP mmol/L 6 6 9  < > 12 9   BUN mg/dL 17 21 27*  < > 28* 31*   CREATININE mg/dL 1 47* 1 41* 1 49*  < > 1 66* 1 58*   EGFR ml/min/1 73sq m 40 42 40  < > 35 37   GLUCOSE RANDOM mg/dL 90 84 91  < > 112 105   CALCIUM mg/dL 8 3 7 6* 7 8*  < > 8 2* 8 1*   AST U/L  --   --   --   --  26 30   ALT U/L  --   --   --   --  27 26   ALK PHOS U/L  --   --   --   --  57 52   TOTAL PROTEIN g/dL  --   --   --   --  6 3* 5 7*   ALBUMIN g/dL  --   --   --   --  2 6* 2 3*   BILIRUBIN TOTAL mg/dL  --   --   --   --  0 40 0 49   < > = values in this interval not displayed  Results from last 7 days  Lab Units 11/29/17  1400 11/29/17  1358 11/25/17  1825 11/24/17 2048   BLOOD CULTURE  No Growth at 24 hrs  No Growth at 24 hrs  No Growth After 5 Days    Staphylococcus aureus*  --    GRAM STAIN RESULT   --   --  Gram positive cocci in clusters  --    MRSA CULTURE ONLY   --   --   --  No Methicillin Resistant Staphlyococcus aureus (MRSA) isolated

## 2017-12-01 NOTE — DISCHARGE INSTRUCTIONS
CBC with diff and creatinine weekly while on the cefazolin    Remove PICC line after last dose of cefazolin 1/4/2017

## 2017-12-01 NOTE — ASSESSMENT & PLAN NOTE
· Currently resides in locked dementia unit as has history elopement  · Patient has pulled his IV line today, PICC line was placed today and it will need to be wrapped to avoid pt potential tapering with the line

## 2017-12-01 NOTE — ASSESSMENT & PLAN NOTE
· Unclear source of patient's septicemia, suspected endocarditis, however, cannot be confirmed as patient's family elected against transesophageal echocardiogram  · Continue Cefazolin through 1/4/17 per ID recommendations  · 2D echo noted for diastolic dysfunction, it did not revealed vegetations, however, cannot definitively rule them out

## 2017-12-01 NOTE — PROGRESS NOTES
Progress Note Clarence Alejandra 80 y o  male MRN: [id number]    Unit/Bed#: [facility] 2 -02 Encounter: [id number]        * Staphylococcus aureus septicemia [facility])   Assessment & Plan    · Unclear source of patient's septicemia, suspected endocarditis, however, cannot be confirmed as patient's family elected against transesophageal echocardiogram  · Continue Cefazolin through 1/4/17 per ID recommendations  · 2D echo noted for diastolic dysfunction, it did not revealed vegetations, however, cannot definitively rule them out  Hypokalemia   Assessment & Plan    · Resolved, low normal K  · Monitor BMP        Acute renal failure superimposed on stage 3 chronic kidney disease (HCC)   Assessment & Plan    · Present on admission and resolved with creatinine at baseline  · Continue to monitor BMP, caution with Nephro toxic medications, renally dose medications        Ambulatory dysfunction   Assessment & Plan    · Present on admission, PT and OT consult        Dementia   Assessment & Plan    · Currently resides in locked dementia unit as has history elopement  · Patient has pulled his IV line today, PICC line was placed today and it will need to be wrapped to avoid pt potential tapering with the line        HTN (hypertension), benign   Assessment & Plan    · Blood pressure is in acceptable range for age especially given history of CKD will avoid drastic decrease in blood pressure  · Continue current medication regimen  · Monitor BMP          Pharmacologic: Heparin  Mechanical VTE Prophylaxis in Place: Yes    Patient Centered Rounds: I have performed bedside rounds with nursing staff today  Discussions with Specialists or Other Care Team Provider: PICC line Nurse    Education and Discussions with Family / Patient: Patient's daughter    Time Spent for Care: 45 minutes  More than 50% of total time spent on counseling and coordination of care as described above      Current Length of Stay: 7 day(s)    Current Patient Status: Inpatient   Certification Statement: The patient will continue to require additional inpatient hospital stay due to completion of diagnostic workup    Discharge Plan / Estimated Discharge Date: tomorrow      Code Status: Level 3 - DNAR and DNI      Subjective:   Patient seen and examined  He appears comfortable  He is alert and awake  He denies pain  He did pull his IV line out this morning  Objective:     Vitals:   Temp (24hrs), Av 2 °F (36 2 °C), Min:96 8 °F (36 °C), Max:97 6 °F (36 4 °C)    HR:  [56-72] 56  Resp:  [18] 18  BP: (132-156)/(67-96) 151/67  SpO2:  [96 %-98 %] 98 %  Body mass index is 32 42 kg/m²  Input and Output Summary (last 24 hours):     No intake or output data in the 24 hours ending 17 1743    Physical Exam:     Physical Exam   HENT:   Head: Normocephalic  Mouth/Throat: Oropharynx is clear and moist    Eyes: Conjunctivae are normal    Neck: No JVD present  Cardiovascular: Regular rhythm  Bradycardia present  Exam reveals no gallop and no friction rub  No murmur heard  Pulmonary/Chest: Effort normal  No respiratory distress  He has no wheezes  He has no rales  Abdominal: Soft  He exhibits no distension  There is no tenderness  There is no guarding  Musculoskeletal: He exhibits no edema  Neurological: He is alert  Skin: Skin is warm and dry  Psychiatric: He is slowed and withdrawn         Additional Data:     Labs:      Results from last 7 days  Lab Units 17  0717  0458   WBC Thousand/uL 8 49 6 13   HEMOGLOBIN g/dL 9 6* 8 6*   HEMATOCRIT % 27 9* 25 2*   PLATELETS Thousands/uL 307 256   NEUTROS PCT %  --  56   LYMPHS PCT %  --  26   MONOS PCT %  --  12   EOS PCT %  --  5       Results from last 7 days  Lab Units 17  0717  1654   SODIUM mmol/L 139  < > 143   POTASSIUM mmol/L 3 6  < > 3 7   CHLORIDE mmol/L 107  < > 108   CO2 mmol/L 26  < > 23   BUN mg/dL 17  < > 28*   CREATININE mg/dL 1 47*  < > 1 66*   CALCIUM mg/dL 8 3  < > 8 2*   TOTAL PROTEIN g/dL  --   --  6 3*   BILIRUBIN TOTAL mg/dL  --   --  0 40   ALK PHOS U/L  --   --  57   ALT U/L  --   --  27   AST U/L  --   --  26   GLUCOSE RANDOM mg/dL 90  < > 112   < > = values in this interval not displayed  Recent Cultures (last 7 days):       Results from last 7 days  Lab Units 11/29/17  1400 11/29/17  1358 11/25/17  1825   BLOOD CULTURE  No Growth at 24 hrs  No Growth at 24 hrs  No Growth After 5 Days    Staphylococcus aureus*   GRAM STAIN RESULT   --   --  Gram positive cocci in clusters       Last 24 Hours Medication List:     amLODIPine 10 mg Oral Daily   calcitriol 0 25 mcg Oral Every Other Day   cefazolin 2,000 mg Intravenous Q8H   memantine 10 mg Oral BID   And      donepezil 5 mg Oral BID   heparin (porcine) 5,000 Units Subcutaneous Q8H [facility]   levothyroxine 125 mcg Oral Daily   nystatin  Topical BID   pravastatin 40 mg Oral Daily With Dinner        Today, Patient Was Seen By: Monserrat Simms MD

## 2017-12-01 NOTE — ASSESSMENT & PLAN NOTE
· Present on admission and resolved with creatinine at baseline  · Continue to monitor BMP, caution with Nephro toxic medications, renally dose medications

## 2017-12-01 NOTE — SOCIAL WORK
CM spoke to daughter, Amrita Flowers, to update on referrals sent since CM will be unable to speak with her after tomorrow  [facility] has a bed today, however, upon speaking with daughter she reported that the patient will need a PICC line placement prior to discharge  She reported she will be in today to sign for that procedure

## 2017-12-01 NOTE — SOCIAL WORK
PETRONA scheduled a BLS transport for 13:00 tomorrow with Wolfgang Anderson at [facility]  Transportation form in chart  Informed: attending, RN, unit clerk, daughter, and facility of transport time  [facility] liaison was present and spoke to the daughter about the need to go to [facility] and sign the paperwork prior to admission  Awaiting PICC placement this afternoon as daughter signed permission for procedure  Awaiting the second [facility] results which will be read at 1:00 today, as per attending's report  As long as negative, patient is set for discharge

## 2017-12-01 NOTE — PROCEDURES
Insert PICC line  Date/Time: 12/1/2017 1:50 PM  Performed by: Dionne Villa by: Leslee Duran     Patient location:  Bedside  Other Assisting Provider: No    Consent:     Consent obtained:  Verbal (Melchor Rios obtained consent )    Consent given by: given by pts daughter   Universal protocol:     Procedure explained and questions answered to patient or proxy's satisfaction: yes      Relevant documents present and verified: yes      Test results available and properly labeled: yes      Imaging studies available: yes      Required blood products, implants, devices, and special equipment available: yes      Site/side marked: yes      Immediately prior to procedure, a time out was called: yes      Patient identity confirmed:  Hospital-assigned identification number and arm band  Pre-procedure details:     Hand hygiene: Hand hygiene performed prior to insertion      Sterile barrier technique: All elements of maximal sterile technique followed      Skin preparation:  ChloraPrep    Skin preparation agent: Skin preparation agent completely dried prior to procedure    Indications:     PICC line indications: long term antibiotics    Anesthesia (see MAR for exact dosages):      Anesthesia method:  Local infiltration    Local anesthetic:  Lidocaine 1% w/o epi (2 ml)  Procedure details:     Location:  Basilic    Vessel type: vein      Laterality:  Right    Approach: percutaneous technique used      Patient position:  Flat    Procedural supplies:  Triple lumen (no 5 Fr piccs in house Dr Ramon Bowels ordered triple )    Catheter size:  5 Fr    Landmarks identified: yes      Ultrasound guidance: yes      Sterile ultrasound techniques: Sterile gel and sterile probe covers were used      Number of attempts:  1    Successful placement: yes      Vessel of catheter tip end:  Chest Xray needed to confirm placement    Total catheter length (cm):  42    Catheter out on skin (cm):  0    Max flow rate:  999ml/hr    Arm circumference: 36  Post-procedure details:     Post-procedure:  Securement device placed    Assessment:  Blood return through all ports    Post-procedure complications: none      Patient tolerance of procedure:   Tolerated well, no immediate complications  Comments:      LOT # [id number]

## 2017-12-02 VITALS
BODY MASS INDEX: 32.42 KG/M2 | DIASTOLIC BLOOD PRESSURE: 61 MMHG | WEIGHT: 225.97 LBS | HEART RATE: 90 BPM | SYSTOLIC BLOOD PRESSURE: 143 MMHG | RESPIRATION RATE: 18 BRPM | OXYGEN SATURATION: 95 % | TEMPERATURE: 97.3 F

## 2017-12-02 PROBLEM — B95.61 STAPHYLOCOCCUS AUREUS BACTEREMIA: Status: ACTIVE | Noted: 2017-11-25

## 2017-12-02 LAB
ANION GAP SERPL CALCULATED.3IONS-SCNC: 8 MMOL/L (ref 4–13)
BASOPHILS # BLD AUTO: 0.04 THOUSANDS/ΜL (ref 0–0.1)
BASOPHILS NFR BLD AUTO: 0 % (ref 0–1)
BUN SERPL-MCNC: 17 MG/DL (ref 5–25)
CALCIUM SERPL-MCNC: 8.3 MG/DL (ref 8.3–10.1)
CHLORIDE SERPL-SCNC: 107 MMOL/L (ref 100–108)
CO2 SERPL-SCNC: 26 MMOL/L (ref 21–32)
CREAT SERPL-MCNC: 1.43 MG/DL (ref 0.6–1.3)
EOSINOPHIL # BLD AUTO: 0.33 THOUSAND/ΜL (ref 0–0.61)
EOSINOPHIL NFR BLD AUTO: 3 % (ref 0–6)
ERYTHROCYTE [DISTWIDTH] IN BLOOD BY AUTOMATED COUNT: 13.7 % (ref 11.6–15.1)
GFR SERPL CREATININE-BSD FRML MDRD: 42 ML/MIN/1.73SQ M
GLUCOSE SERPL-MCNC: 77 MG/DL (ref 65–140)
HCT VFR BLD AUTO: 27.2 % (ref 36.5–49.3)
HGB BLD-MCNC: 9.1 G/DL (ref 12–17)
LYMPHOCYTES # BLD AUTO: 1.72 THOUSANDS/ΜL (ref 0.6–4.47)
LYMPHOCYTES NFR BLD AUTO: 16 % (ref 14–44)
MCH RBC QN AUTO: 30.5 PG (ref 26.8–34.3)
MCHC RBC AUTO-ENTMCNC: 33.5 G/DL (ref 31.4–37.4)
MCV RBC AUTO: 91 FL (ref 82–98)
MONOCYTES # BLD AUTO: 0.88 THOUSAND/ΜL (ref 0.17–1.22)
MONOCYTES NFR BLD AUTO: 8 % (ref 4–12)
NEUTROPHILS # BLD AUTO: 7.98 THOUSANDS/ΜL (ref 1.85–7.62)
NEUTS SEG NFR BLD AUTO: 73 % (ref 43–75)
NRBC BLD AUTO-RTO: 0 /100 WBCS
PLATELET # BLD AUTO: 336 THOUSANDS/UL (ref 149–390)
PMV BLD AUTO: 10.7 FL (ref 8.9–12.7)
POTASSIUM SERPL-SCNC: 4 MMOL/L (ref 3.5–5.3)
RBC # BLD AUTO: 2.98 MILLION/UL (ref 3.88–5.62)
SODIUM SERPL-SCNC: 141 MMOL/L (ref 136–145)
WBC # BLD AUTO: 10.95 THOUSAND/UL (ref 4.31–10.16)

## 2017-12-02 PROCEDURE — 80048 BASIC METABOLIC PNL TOTAL CA: CPT | Performed by: FAMILY MEDICINE

## 2017-12-02 PROCEDURE — 85025 COMPLETE CBC W/AUTO DIFF WBC: CPT | Performed by: FAMILY MEDICINE

## 2017-12-02 RX ORDER — ACETAMINOPHEN 325 MG/1
650 TABLET ORAL EVERY 6 HOURS PRN
Qty: 30 TABLET | Refills: 0 | Status: SHIPPED | OUTPATIENT
Start: 2017-12-02 | End: 2017-12-11 | Stop reason: ALTCHOICE

## 2017-12-02 RX ORDER — AMLODIPINE BESYLATE 10 MG/1
10 TABLET ORAL DAILY
Qty: 30 TABLET | Refills: 0 | Status: SHIPPED | OUTPATIENT
Start: 2017-12-02

## 2017-12-02 RX ADMIN — CALCITRIOL 0.25 MCG: 0.25 CAPSULE, LIQUID FILLED ORAL at 09:20

## 2017-12-02 RX ADMIN — MEMANTINE 10 MG: 5 TABLET ORAL at 09:21

## 2017-12-02 RX ADMIN — NYSTATIN: 100000 POWDER TOPICAL at 09:21

## 2017-12-02 RX ADMIN — AMLODIPINE BESYLATE 10 MG: 10 TABLET ORAL at 09:20

## 2017-12-02 RX ADMIN — HEPARIN SODIUM 5000 UNITS: 5000 INJECTION, SOLUTION INTRAVENOUS; SUBCUTANEOUS at 05:48

## 2017-12-02 RX ADMIN — CEFAZOLIN SODIUM 2000 MG: 2 SOLUTION INTRAVENOUS at 02:09

## 2017-12-02 RX ADMIN — CEFAZOLIN SODIUM 2000 MG: 2 SOLUTION INTRAVENOUS at 10:54

## 2017-12-02 RX ADMIN — LEVOTHYROXINE SODIUM 125 MCG: 125 TABLET ORAL at 05:48

## 2017-12-02 RX ADMIN — DONEPEZIL HYDROCHLORIDE 5 MG: 5 TABLET, FILM COATED ORAL at 09:20

## 2017-12-02 NOTE — DISCHARGE SUMMARY
Discharge Summary - [facility] Internal Medicine    Patient Information: Zoe Salas 80 y o  male MRN: [id number]  Unit/Bed#: [facility] 2 [facility] 205-02 Encounter: [id number]    Discharging Physician / Practitioner: Loreen Seip, MD  PCP: Booker Cesar MD  Admission Date: 11/24/2017  Discharge Date: 12/02/17    Reason for Admission: Positive blood cultures    Discharge Diagnoses:     Principal Problem:    Staphylococcus aureus bacteremia  Active Problems:    GERD (gastroesophageal reflux disease)    Normocytic anemia    Hypothyroid    HTN (hypertension), benign    Dementia    Ambulatory dysfunction    Hyperlipidemia    Acute renal failure superimposed on stage 3 chronic kidney disease ([facility] [address] )    Hypokalemia  Resolved Problems:    Pneumonia      Consultations During Hospital Stay:  · Infectious Disease    Procedures Performed:     · Transthoracic echo:  Systolic function was vigorous  Ejection fraction was estimated to be 75 %  There were no regional wall motion abnormalities  Wall thickness was mildly increased  Doppler parameters were consistent with abnormal left ventricular relaxation (grade 1 diastolic dysfunction)      MITRAL VALVE:  There was mild annular calcification  There was a mild prolapse involving the anterior and posterior leaflets  There was trace regurgitation  This study is not adequate to completely exclude the possibility of a vegetation      AORTIC VALVE:  There was mild regurgitation  This study is not adequate to completely exclude the possibility of a vegatation      TRICUSPID VALVE:  There was trace regurgitation  Significant Findings / Test Results:     CT abdomen pelvis wo contrast   Limited exam due to motion and without IV and oral contrast   1   No definite acute findings in the abdomen or pelvis  2    Colonic diverticulosis   Gallstones  3   Partially calcified mesenteric nodule in the right hemiabdomen, nonspecific but can be seen with entities such as carcinoid disease   Correlate clinically     4   Enlarged prostate   Bladder diverticula  5   Moderate-sized hiatal hernia   Patchy consolidation in the right lower lobe posteriorly may represent atelectasis or infiltrate   Minimal consolidation in the left lower lobe posteriorly  Blood cultures 11/24 - 2 sets staph aureus  Blood cultures 11/25 - 1/2 sets staph aureus  Blood cultures 11/29 no growth x 48 hours    Incidental Findings:   · None    Test Results Pending at Discharge (will require follow up): · None     Outpatient Tests Requested:  · CBC and BMP weekly while on antibiotics    Complications:  None    Hospital Course:     Merced Lozano is a 80 y o  male patient who originally presented to the hospital on 11/24/2017 due to staph aureus bacteremia  The patient was afebrile with stable vital signs  Infectious Disease was consulted and extensive infectious workup was obtained including TTE and CT chest, abdomen and pelvis as well as urinalysis, however, no source of patient's bacteremia was determined with non-invasive workup  Family was recommended that patient undergo transesophageal echo to rule out endocarditis, however, due to patient's advanced age and severe dementia, the family refused  The patient was found to be in acute kidney failure in setting of CKD and was treated with IVF with subsequent improvement of his KULWINDER  His BMP will need to be monitored weekly upon discharge  The patient had a PICC line placed prior to his discharge  He is to remain on Cefazolin for total duration of 6 weeks per ID recommendations in the case the patient had undiagnosed endocarditis  The patient is to obtain weekly blood work until the course of antibiotics is completed  The patient was discharged to [facility] in stable condition  Given his baseline dementia precautions must be made to avoid having patient taper with the PICC line in his right arm      Condition at Discharge: stable     Discharge Day Visit / Exam: Subjective:  Patient seen and examined  He appears comfortable  He is in his baseline mental status  Vitals: Blood Pressure: 143/61 (12/02/17 0726)  Pulse: 90 (12/02/17 0726)  Temperature: (!) 97 3 °F (36 3 °C) (12/02/17 0726)  Temp Source: Tympanic (12/02/17 0726)  Respirations: 18 (12/02/17 0726)  Weight - Scale: 102 kg (225 lb 15 5 oz) (11/24/17 1408)  SpO2: 95 % (12/02/17 0726)  Exam:     Physical Exam   Constitutional: He appears well-nourished  HENT:   Mouth/Throat: Oropharynx is clear and moist    Eyes: Conjunctivae are normal    Cardiovascular: Normal rate and regular rhythm  Exam reveals distant heart sounds  Exam reveals no gallop and no friction rub  No murmur heard  Pulmonary/Chest: Effort normal  No stridor  No respiratory distress  He has no wheezes  He has no rales  Abdominal: Soft  He exhibits no distension  There is no tenderness  There is no guarding  Musculoskeletal: He exhibits no tenderness  Neurological: He is alert  Skin: Skin is warm and dry  Discussion with Family: Yes, daughter    Discharge instructions/Information to patient and family:   See after visit summary for information provided to patient and family  Provisions for Follow-Up Care:  See after visit summary for information related to follow-up care and any pertinent home health orders  Disposition:     Other [facility]    For Discharges to [facility] SNF:   · Not Applicable to this Patient - Not Applicable to this Patient    Planned Readmission: No     Discharge Statement:  I spent 35 minutes discharging the patient  This time was spent on the day of discharge  I had direct contact with the patient on the day of discharge  Greater than 50% of the total time was spent examining patient, answering all patient questions, arranging and discussing plan of care with patient as well as directly providing post-discharge instructions    Additional time then spent on discharge activities  Discharge Medications:  See after visit summary for reconciled discharge medications provided to patient and family        ** Please Note: This note has been constructed using a voice recognition system **

## 2017-12-04 LAB
BACTERIA BLD CULT: NORMAL
BACTERIA BLD CULT: NORMAL

## 2017-12-11 ENCOUNTER — HOSPITAL ENCOUNTER (EMERGENCY)
Facility: HOSPITAL | Age: 82
Discharge: NURSING, REHAB OR LONG-TERM CARE | End: 2017-12-11
Attending: EMERGENCY MEDICINE | Admitting: EMERGENCY MEDICINE
Payer: MEDICARE

## 2017-12-11 ENCOUNTER — GENERIC CONVERSION - ENCOUNTER (OUTPATIENT)
Dept: OTHER | Facility: OTHER | Age: 82
End: 2017-12-11

## 2017-12-11 ENCOUNTER — APPOINTMENT (EMERGENCY)
Dept: RADIOLOGY | Facility: HOSPITAL | Age: 82
End: 2017-12-11
Payer: MEDICARE

## 2017-12-11 VITALS
SYSTOLIC BLOOD PRESSURE: 148 MMHG | DIASTOLIC BLOOD PRESSURE: 82 MMHG | RESPIRATION RATE: 18 BRPM | WEIGHT: 235 LBS | HEART RATE: 100 BPM | BODY MASS INDEX: 33.72 KG/M2 | TEMPERATURE: 98.3 F | OXYGEN SATURATION: 94 %

## 2017-12-11 DIAGNOSIS — N18.30 CKD (CHRONIC KIDNEY DISEASE) STAGE 3, GFR 30-59 ML/MIN (HCC): ICD-10-CM

## 2017-12-11 DIAGNOSIS — R78.81 STAPHYLOCOCCUS AUREUS BACTEREMIA: Primary | ICD-10-CM

## 2017-12-11 DIAGNOSIS — Z95.828 S/P PICC CENTRAL LINE PLACEMENT: ICD-10-CM

## 2017-12-11 DIAGNOSIS — B95.61 STAPHYLOCOCCUS AUREUS BACTEREMIA: Primary | ICD-10-CM

## 2017-12-11 PROCEDURE — 71010 HB CHEST X-RAY 1 VIEW FRONTAL: CPT

## 2017-12-11 PROCEDURE — 36569 INSJ PICC 5 YR+ W/O IMAGING: CPT

## 2017-12-11 PROCEDURE — 99285 EMERGENCY DEPT VISIT HI MDM: CPT

## 2017-12-11 PROCEDURE — C1751 CATH, INF, PER/CENT/MIDLINE: HCPCS

## 2017-12-11 RX ORDER — SORBITOL SOLUTION 70 %
30 SOLUTION, ORAL MISCELLANEOUS DAILY PRN
COMMUNITY

## 2017-12-11 RX ORDER — BISACODYL 10 MG
10 SUPPOSITORY, RECTAL RECTAL DAILY PRN
COMMUNITY

## 2017-12-11 NOTE — ED NOTES
Pt removed from restraints by previous nurse  Pt not in restraints at this time  Pt sleeping on ED stretcher        Johnny Rao RN  12/11/17 3721

## 2017-12-11 NOTE — ED NOTES
[place] nursing supervisor contacted for further contact information for daughters  Additional contact info: Juniortalita Yi () and Lisa Dacosta [phone])  Daughters called by JUDY Black RN on new numbers with no answer  Voice message left on voicemail        Dylan Johnson RN  12/11/17 8945

## 2017-12-11 NOTE — ED NOTES
[place] called back for update; Sivakumar's number provided to N  [facility] )  Irwin Sieving called and dicussed the need for PICC; informed that POA is currently on vacation  Irwin Sieving gave consent for procedure to Dr Amanda Flynn  PICC team alerted and will be at bedside shortly         Denny West RN  12/11/17 3127

## 2017-12-11 NOTE — ED PROVIDER NOTES
History  Chief Complaint   Patient presents with    PICC Line Problem     According to [facility], nurse went into room to give IV medications through PICC line, PICC line was missing from R arm  No complaints or other issues reported by pt and nursing staff at [facility]  Needs new PICC  79-year-old male sent in for placement of PICC line  Patient has PICC line in place for IV antibiotics secondary to bacteremia from prior pneumonia  Patient is an provide any meaningful history secondary to dementia  As per nursing home staff when they went to give patient is morning medications he noted that his right arm PICC line was missing  He offered no complaints and there was no other reported issues from the nursing home staff  Patient was sent in today for replacement of PICC line        History provided by:  Nursing home  History limited by:  Dementia      Prior to Admission Medications   Prescriptions Last Dose Informant Patient Reported? Taking? LEVOTHYROXINE SODIUM PO   Yes Yes   Sig: Take 125 mcg by mouth daily     Memantine HCl-Donepezil HCl (NAMZARIC) 28-10 MG CP24   Yes Yes   Sig: Take 1 capsule by mouth   amLODIPine (NORVASC) 10 mg tablet   No Yes   Sig: Take 1 tablet by mouth daily   bisacodyl (DULCOLAX) 10 mg suppository   Yes Yes   Sig: Insert 10 mg into the rectum daily as needed for constipation   calcitriol (ROCALTROL) 0 25 mcg capsule   Yes Yes   Sig: Take 0 25 mcg by mouth every other day 3 times per week ( M/W/F)    ceFAZolin (ANCEF) 2000 mg IVPB   No Yes   Sig: Infuse 2,000 mg into a venous catheter every 8 (eight) hours for 33 days   febuxostat (ULORIC) 40 mg tablet   Yes Yes   Sig: Take by mouth   furosemide (LASIX) 20 mg tablet   No Yes   Sig: Take 1 tablet by mouth daily as needed (edema)   nystatin (MYCOSTATIN) powder   No Yes   Sig: Apply topically 2 (two) times a day   simvastatin (ZOCOR) 20 mg tablet   Yes Yes   Sig: Take 20 mg by mouth daily at bedtime     sorbitol 70 % solution   Yes Yes   Sig: Take 30 mL by mouth daily as needed      Facility-Administered Medications: None       Past Medical History:   Diagnosis Date    KULWINDER (acute kidney injury) ([facility] ) 11/18/2017    Ambulatory dysfunction     Arthritis     CKD (chronic kidney disease) stage 4, GFR 15-29 ml/min ([facility]     CVA (cerebral vascular accident) ([facility] )     Dementia     Disease of thyroid gland     Elevated blood uric acid level     GERD (gastroesophageal reflux disease)     GI bleed     Gout 11/18/2017    HTN (hypertension), benign     Hyperlipidemia     Hyperlipidemia     Hypertension     Hypothyroid     Normocytic anemia     [facility]AD [facility]peripheral [facility]) [facility]     Pulmonary embolism (HCC)     Renal disorder     Secondary hyperparathyroidism ([facility] )     Sepsis ([place] [address] )     Stroke ([place] [address] )     Vitamin D deficiency        Past Surgical History:   Procedure Laterality Date    NO PAST SURGERIES         Family History   Problem Relation Age of Onset    Diabetes Mother     Cancer Father      I have reviewed and agree with the history as documented      Social History   Substance Use Topics    Smoking status: Never Smoker    Smokeless tobacco: Never Used    Alcohol use Yes      Comment: socially         Review of Systems   Unable to perform ROS: Dementia       Physical Exam  ED Triage Vitals   Temperature Pulse Respirations Blood Pressure SpO2   12/11/17 0734 12/11/17 0734 12/11/17 0734 12/11/17 0734 12/11/17 0734   98 2 °F (36 8 °C) 79 18 166/83 95 %      Temp Source Heart Rate Source Patient Position - Orthostatic VS BP Location FiO2 (%)   12/11/17 0734 12/11/17 0912 12/11/17 0912 12/11/17 0912 --   Temporal Monitor Lying Left arm       Pain Score       12/11/17 0734       No Pain           Orthostatic Vital Signs  Vitals:    12/11/17 0912 12/11/17 1034 12/11/17 1249 12/11/17 1428   BP: 162/74 158/74 161/76 (!) 158/107   Pulse: 75 80 75 73   Patient Position - Orthostatic VS: Lying  Lying        Physical Exam   Constitutional: He is oriented to person, place, and time  He appears well-developed and well-nourished  HENT:   Mouth/Throat: No oropharyngeal exudate  TMs normal bilaterally no pharyngeal erythema no rhinorrhea nontender palpation of sinuses, normal looking turbinates   Eyes: Conjunctivae and EOM are normal    Neck: Normal range of motion  Neck supple  No meningeal signs   Cardiovascular: Normal rate, regular rhythm, normal heart sounds and intact distal pulses  Pulmonary/Chest: Effort normal and breath sounds normal  No respiratory distress  He has no wheezes  He has no rales  He exhibits no tenderness  Abdominal: Soft  Bowel sounds are normal  He exhibits no distension and no mass  There is no tenderness  No hernia  No cvat   Musculoskeletal: Normal range of motion  He exhibits no edema  Small hematoma right upper arm without active extravasation, palpable thrill  Patient neurovascularly intact distal   No evidence of infection  Lymphadenopathy:     He has no cervical adenopathy  Neurological: He is alert and oriented to person, place, and time  No cranial nerve deficit  Skin: No rash noted  No erythema  No edema   Psychiatric: He has a normal mood and affect  His behavior is normal    Nursing note and vitals reviewed  ED Medications  Medications - No data to display    Diagnostic Studies  Results Reviewed     None                 XR chest PICC line portable   ED Interpretation by Megan Farley MD (12/11 1531)   picc line in position  No pnthx                 Procedures  Procedures       Phone Contacts  ED Phone Contact    ED Course  ED Course as of Dec 11 1532   Mon Dec 11, 2017   0920 Voicemail left for VAL Templeton to return call  1003 Attempted to contact POA again    9846 Case D/W Deloras Rear pts grand son since both of his daughters are unable to be reached and are away on vacation   He understands risk/benefits of procedure and consents for picc line MDM  Number of Diagnoses or Management Options  Staphylococcus aureus bacteremia:   Diagnosis management comments: Will consult PICC team for PICC line placement  CritCare Time    Disposition  Final diagnoses:   Staphylococcus aureus bacteremia   S/P PICC central line placement - needed replacement     Time reflects when diagnosis was documented in both MDM as applicable and the Disposition within this note     Time User Action Codes Description Comment    12/11/2017  8:57 AM Rosalie Guanakito Add [R78 81] Staphylococcus aureus bacteremia     12/11/2017  2:29 PM Denise Tracies E Add [N18 3] CKD (chronic kidney disease) stage 3, GFR 30-59 ml/min     12/11/2017  2:29 PM Denise Mimes E Modify [N18 3] CKD (chronic kidney disease) stage 3, GFR 30-59 ml/min     12/11/2017  2:29 PM Denise Mimes E Modify [N18 3] CKD (chronic kidney disease) stage 3, GFR 30-59 ml/min     12/11/2017  3:31 PM Rosalie Guanakito Add [K12 967] S/P PICC central line placement     12/11/2017  3:32 PM Rosalie Guanakito Modify [F21 830] S/P PICC central line placement needed replacement      ED Disposition     ED Disposition Condition Comment    Discharge  Braden Penzhou discharge to home/self care  Condition at discharge: Good        Follow-up Information     Follow up With Specialties Details Toni Busch MD Internal Medicine Schedule an appointment as soon as possible for a visit in 2 days  [id number] [facility]          Patient's Medications   Discharge Prescriptions    No medications on file     No discharge procedures on file      ED Provider  Electronically Signed by           Christos Mishra MD  12/11/17 4451

## 2017-12-11 NOTE — PROCEDURES
Insert PICC line  Date/Time: 12/11/2017 2:23 PM  Performed by: Danitza Trujillo by: Mary Carmen Bermudez     Patient location:  Bedside and ED  Consent:     Consent obtained:  Verbal    Consent given by:  Guardian (via phone pts celina Akbar)    Procedural risks discussed: consent obtained by physician  [place] protocol:     Procedure explained and questions answered to patient or proxy's satisfaction: yes      Relevant documents present and verified: yes      Test results available and properly labeled: yes      Imaging studies available: yes      Required blood products, implants, devices, and special equipment available: yes      Site/side marked: yes      Immediately prior to procedure, a time out was called: yes      Patient identity confirmed:  Arm band and hospital-assigned identification number  Pre-procedure details:     Hand hygiene: Hand hygiene performed prior to insertion      Sterile barrier technique: All elements of maximal sterile technique followed      Skin preparation:  ChloraPrep    Skin preparation agent: Skin preparation agent completely dried prior to procedure    Indications:     PICC line indications: long term antibiotics    Anesthesia (see MAR for exact dosages):      Anesthesia method:  Local infiltration (3ml)    Local anesthetic:  Lidocaine 1% w/o epi  Procedure details:     Location:  Brachial    Vessel type: vein      Laterality:  Left    Approach: percutaneous technique used      Patient position:  Flat    Procedural supplies:  Double lumen    Catheter size:  5 Fr    Landmarks identified: yes      Ultrasound guidance: yes      Sterile ultrasound techniques: Sterile gel and sterile probe covers were used      Number of attempts:  1    Successful placement: yes      Vessel of catheter tip end:  Chest Xray needed to confirm placement    Total catheter length (cm):  43    Catheter out on skin (cm):  1    Max flow rate:  999ml/hr    Arm circumference:  36cm  Post-procedure details:     Post-procedure:  Dressing applied and securement device placed    Assessment:  Blood return through all ports, free fluid flow and placement verification pending x-ray result    Post-procedure complications: none      Patient tolerance of procedure:   Tolerated with difficulty  Comments:      Pt hx of dementia  Lot #[id number]

## 2017-12-11 NOTE — ED NOTES
Radha Nowak called at this time for consent; did not  phone call  Bridger Frye RN will call back in 30 minutes        Dylan Johnson RN  12/11/17 1134

## 2017-12-11 NOTE — ED NOTES
PICC RN Mani Oquendo called back and stated that she could be at bedside once procedural consent is obtain  VAL Crow (daughter) will be called by Dr Mil Kumar RN  12/11/17 5439

## 2017-12-11 NOTE — DISCHARGE INSTRUCTIONS
How to Care for Your Peripherally Inserted Central Catheter   WHAT YOU NEED TO KNOW:   A PICC is an IV placed into a large blood vessel near your heart  It is usually inserted through a blood vessel in your arm  Your PICC may have multiple ports  Ports are tubes where you can inject medicine  A PICC can stay in place for several weeks or months  You may need a PICC to get nutrition, medicine, or fluids  Blood samples can be removed from your PICC and sent to the lab for tests  DISCHARGE INSTRUCTIONS:   Call 911 for any of the following:   · You feel lightheaded, short of breath, and have chest pain  · You cough up blood  · You have trouble breathing  Seek care immediately if:   · Blood soaks through your bandage  · Your arm or leg feels warm, tender, and painful  It may look swollen and red  · You have trouble moving your arm  · Your catheter falls out  Contact your healthcare provider if:   · You have a fever or swelling, redness, pain, or pus where the catheter was inserted  · You cannot flush your catheter, or you feel pain when you flush your catheter  · You see a hole or crack in the tubing of your catheter  · You see fluid leaking from the insertion site  · You run out of supplies to care for your catheter  · You have questions or concerns about your condition or care  How to change your bandage and clean your skin:  Change your bandage every 7 days or as directed  Change the bandage any time it becomes wet, dirty, or moves out of place  Keep your catheter covered with a bandage at all times  · Get a bandage kit and place it on a clean surface  · Wash your hands with soap and water or use an alcohol-based hand rub  · Put on clean gloves and a mask  If someone else is helping you, that person also needs to wear a mask and gloves  · Carefully remove the old bandage and securement device  Remove your gloves and throw them away       · Wash your hands with soap and water or use an alcohol-based hand rub  · Open the bandage kit with the folded side facing up  Carefully unfold the corners of the bandage kit  Do not touch anything inside of the bandage kit  Everything inside of the bandage kit is sterile  ·  each glove by the folded part and put them on  Do not touch the outside of the gloves with your bare hand  Do not let them touch anything that is not sterile  · Open the cleaning pads and lay them on the bandage kit  · Use the cleaning pads to scrub the area where the catheter is inserted into your skin (insertion site)  Scrub the area around the insertion site as directed  Start at the insertion site and clean outward from it in circles  · Clean the tubing that comes out of your skin as directed  · Place the pad that fits around the insertion site as directed  Place the securement device around your catheter as directed  · Apply the bandage as directed  If the bandage is clear, make sure you can see the insertion site  How to care for caps and tubing:   · Clean the injection cap before and after each use  Wash your hands and put on gloves before you clean each injection cap  Hold the catheter above the cap with 1 hand  Scrub the injection cap with an alcohol pad for 15 seconds  · Change the injection caps every 3 to 7 days or as directed  Wash your hands and put on gloves before you change the caps  If there are clamps on your catheter, close the clamps on each port  Twist the caps to remove them from the end of each port  Scrub the end of each port with an alcohol pad for 15 seconds  Place a new cap on the end of each port  Your healthcare provider may tell you to place protective caps over the injection caps  The caps will protect your catheter from infection when it is not being used  · Change and clean the medicine tubing as directed  You may need to attach extra tubing to your catheter when you get medicine   Ask your healthcare provider how often to change medicine tubing  Wash your hands and put on gloves before you touch medicine tubing  Wipe the end of the tubing with an alcohol wipe before you attach it the injection cap  Always place a cap over the end of medicine tubing when you are not using it  How to flush your PICC:  Your healthcare provider will tell you how often to flush your catheter  He will also tell you how much saline or heparin to flush the catheter with  Always flush your catheter before and after you get medicine through it  Do the following to flush your catheter:  · Wash your hands with soap and water or an alcohol-based hand rub  Put on clean gloves  · Scrub the injection cap with an alcohol pad for 15 seconds  · Attach the saline or heparin syringe to the injection cap  Open the clamp if your catheter has one  · Slowly push on the plunger of the syringe to flush your catheter  Do not force the saline or heparin into your catheter  This could damage the catheter or your vein  Call your healthcare provider if you cannot flush your catheter  · Detach the syringe and throw it away  Scrub the injection cap with an alcohol pad for 15 seconds  Prevent a bloodstream infection:   · Wash your hands often  Wash your hands before and after you touch your catheter  Use soap and water or an alcohol-based hand rub  Tell others to wash their hands before and after they visit  This will decrease germs in your home  · Limit contact with your catheter  Only touch your catheter when you need to give yourself medicine or clean it  Do not let others touch your catheter or medicine tubing  · Keep the tubing clamped when not in use  This will prevent air and water from getting into your catheter  · Do not swim or take a bath  These actions can cause germs to get into your catheter  · Cover your catheter with a waterproof cover before you shower    Ask your healthcare provider where to buy a waterproof cover  He may instead tell you to place a plastic bag or wrap over your catheter  Keep your catheter out of the water as much as possible  Change the bandage if it gets wet  · Check your catheter every day for signs of infection  Look for redness, swelling, pus, or fluid  Report any pain at the insertion site or signs of infection to your healthcare provider right away  Self-care:   · Ask your healthcare provider which activities are safe to do  Do not lift anything heavier than 10 pounds with your arm that has the catheter  · Drink plenty of liquids  Liquids will help prevent dehydration and blood clots  Ask your healthcare provider how much liquid to drink each day and which liquids are best for you  · Tell healthcare providers that you have a catheter  Tell them not to do, IVs, blood draws, and blood pressure readings in the arm with your catheter  Do not allow flu shots or vaccinations in the arm with your catheter  Follow up with your healthcare provider as directed:  Write down your questions so you remember to ask them during your visits  © 2017 [facility] Information is for End User's use only and may not be sold, redistributed or otherwise used for commercial purposes  All illustrations and images included in CareNotes® are the copyrighted property of A D A M , Inc  or [employer]wood  The above information is an  only  It is not intended as medical advice for individual conditions or treatments  Talk to your doctor, nurse or pharmacist before following any medical regimen to see if it is safe and effective for you

## 2017-12-11 NOTE — ED NOTES
PICC RN would like to try one more time for PICC access with possible b/l upper limb restraints  Dr Blanc Heart alerted of request and gave order for soft limb restraints        Joy Root RN  12/11/17 8267

## 2017-12-11 NOTE — PROGRESS NOTES
Pt seen for PICC insertion  Unable to position patient for picc insertion  Pt hx of dementia  Consent obtained  Pt unable to follow commands uncooperative to hold position  Pt unable to hold still with assistance for bed side picc insertion  Pt continues to break sterile field  Rn notified  IR notified and IR consult

## 2017-12-11 NOTE — ED NOTES
Daughters Karime Davis and [place] called by [employer]  Sameer Patterson RN; voice message left on voicemail to call back to POLLY Bowens RN  12/11/17 0623

## 2017-12-11 NOTE — ED NOTES
Small puncture scab at site of previous PICC line; no bleeding from site at this time  Pt appears comfortable and in no distress        Lynnette Anguiano RN  12/11/17 1792

## 2017-12-11 NOTE — ED NOTES
Zeke Hogan and Jayne Coburn called again; no answer by both    [facility] alerted        Bar Sim, RN  12/11/17 9668

## 2017-12-11 NOTE — ED NOTES
Daughters Jefferson Norberto and Israel Burns called again; no answer by both  Voice message left on voice mail at this time        Cassi Mcallister RN  12/11/17 8157

## 2017-12-11 NOTE — ED NOTES
Attempted to call [facility] to give report on pt care  No answer at this time        Angela Kurtz RN  12/11/17 8866

## 2018-01-12 NOTE — PROCEDURES
Procedures by LAKSHMI Bai at  6/28/2016  2:00 PM      Author:  LAKSHMI Bai Service:  (none) Author Type:  Speech and Language Pathologist     Filed:  6/28/2016  3:50 PM Date of Service:  6/28/2016  2:00 PM Status:  Signed     :  LAKSHMI Bai (Speech and Language Pathologist)                                               Video Swallow Study      Patient Name: Hank Fernandez  Today's Date: 6/28/2016  par  par  Past Medical History  Past Medical History     Diagnosis  Date    Ambulatory dysfunction     Arthritis     CKD (chronic kidney disease) stage 4, GFR 15-29 ml/min     CVA (cerebral vascular accident)     Dementia     Disease of thyroid gland     Elevated blood uric acid level     GERD (gastroesophageal reflux disease)     GI bleed     HTN (hypertension), benign     Hyperlipidemia     Hyperlipidemia     Hypertension     Hypothyroid     Normocytic anemia     PAD (peripheral artery disease)     Pulmonary embolism     Renal disorder     Secondary hyperparathyroidism     Stroke     Vitamin D deficiency         Past Surgical History  Past Surgical History      Procedure  Laterality Date    No past surgeries           Pt is a 80yom w/ h/o dysphagia referred for VBS  INpt admitted w/ left hand cellulitis  H/o intermittent aspiration on all consistencies VBS 3/2008  Current Diet:  Downgraded to dysphagia 2 mechanical and thin yesterday  Premorbid diet:  cut up  Thin  Dentition:  Minimal, frontal   O2 requirement:  no  Vocal Quality/Speech:  Wnl more verbal today  Cognitive status:  Confused, dementia  Able to express basic needs  Consistencies administered: Barium laden applesauce, cheerios/nectar, soft solid, hard solid, honey thick, nectar thick, thin liquids, 1/2 an 1 whole 13mm barium pill in apple sauce    Liquids were administered by cup x 1, then  straw  Pt was seated laterally at 90 degrees       Oral stage:  Mild/mod  Lip closure:adequate  Mastication:very prolonged, frontal  Bolus formation: wfl/mild  Bolus control: reduced w/ liquids  Transfer: wfl/mildly weak  Residue:mild/none    Pharyngeal stage:  MOD  Swallow promptness: wnl/mild on initial swallow  Secondary swallow was delayed or absent  Spill to valleculae: with all but puree  Spill to pyriforms: w/ nectar and thin  Epiglottic inversion:incomplete  Laryngeal rise: at least mildly reduced  anter movement was greater than superior  Pharyngeal constriction: at least mildly reduced  Vallecular retention:mild to mod  Pyriform retention:mild to mod  Greater w/ liquids  PPW coating: yes  CP prominence: noted  Retropulsion from prominence:none  Epiglottic undercoat: w/ most trials  Penetration: trace w/ most trials  Aspiration: trace w/ mixed consistency, intermittent  trace saliva mixed w/ solids, trace w/ honey thick and nectar, mild w/ thin  Pt often had penetration or aspiration in between swallows on pharyngeal retention  (noted when fluoro was turned back on)  No cough response to small amts aspiration  Coughed w/ larger amount  Strategies: secondary swallow cleared most retention  Cued cough inconsistently cleared the airway  Screening of Esophageal stage:  Hold up of 13mm pill mid esophagus  Cleared w/ additional po  Summary:  Pt presented w/ mild/mod oral stage, mod pharyngeal stage as detailed above  Likely at risk for mild chronic aspiration  Per daughter, pt has had no pna's or respiratory issues  Recommendations:  Diet: Dysphagia 2 mechanical soft  Liquids:nectar  Meds: break or crush if large  Strategies: doubel swallow, volitional cough  F/u ST tx: yes, brief  Close Supervision  Results reviewed with: pt, daughter, physician, nurse  Aspiration precaution posted  Goals:  Pt will tolerate least restrictive diet w minimal s/s aspiration or oral/pharyngeal difficulties                     Received for:Provider  EPIC   Jun 28 2016  3:49PM [facility] Standard Time

## 2018-01-23 NOTE — PROCEDURES
Procedures by Josh Monte RN at 12/11/2017  2:23 PM      Author: Josh Monte RN Service:  (none) Author Type:  Registered Nurse    Filed:  12/11/2017  2:26 PM Date of Service:  12/11/2017  2:23 PM Status:  Signed    : Josh Monte RN (Registered Nurse)        Procedure Orders:       1  Insert PICC line [id number] ordered by Yen Hannon MD at 12/11/17 0857                  Insert PICC  line  Date/Time: 12/11/2017 2:23 PM  Performed by: Enoch Martinez by: Rita Peace     Patient location:  Bedside and ED  Consent:     Consent obtained:  Verbal    Consent given by:  Guardian (via phone pts celina Snider)    Procedural risks discussed: consent obtained by physician  [place] protocol:     Procedure explained and questions answered to patient or proxy's satisfaction: yes      Relevant documents present and verified: yes      Test results available and properly labeled: yes       Imaging studies available: yes      Required blood products, implants, devices, and special equipment available: yes      Site/side marked: yes      Immediately prior to procedure, a time out was called: yes      Patient identity confirmed:  Arm band and hospital-assigned identification number  Pre-procedure details:     Hand hygiene: Hand hygiene performed prior to insertion      Sterile barrier technique: All elements of maximal sterile technique followed      Skin preparation:  ChloraPrep    Skin preparation agent: Skin preparation agent completely dried prior to procedure    Indications:     PICC line indications: long term antibiotics    Anesthesia (see MAR for exact dosages):      Anesthesia method:  Local infiltration (3ml)    Local anesthetic:  Lidocaine 1% w/o epi  Procedure details:     Location:  Brachial    Vessel type: vein      Laterality:  Left    Approach: percutaneous technique used      Patient position:  Flat    Procedural supplies:  Double lumen    Catheter size:  5 Fr    Landmarks identified: yes      Ultrasound guidance: yes      Sterile ultrasound techniques: Sterile gel and sterile probe covers were used      Number of attempts:  1    Successful placement: yes      Vessel of catheter tip end:  Chest Xray needed to confirm placement    Total catheter length (cm):  43    Catheter out on skin (cm):  1    Max flow rate:  999ml/hr    Arm circumference:  36cm  Post-procedure details:     Post-procedure:  Dressing applied and securement device placed    Assessment:  Blood return through all ports, free fluid flow and placement verification pending x-ray result    Post-procedure complications: none      Patient tolerance of procedure:   Tolerated with difficulty  Comments:      Pt hx of dementia  Lot #[id number]                     Received for:Provider  EPIC   Dec 11 2017  2:26PM [facility] Standard Time

## 2018-01-23 NOTE — MISCELLANEOUS
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)   3  Gait disturbance (781 2) (R26 9)   4  Gout (274 9) (M10 9)   5  Hyperlipidemia (272 4) (E78 5)   6  Hypertension (401 9) (I10)   7  Hypothyroidism (244 9) (E03 9)   8  Peripheral arterial disease (443 9) (I73 9)   9  Urinary incontinence (788 30) (R32)   10  Venous insufficiency (459 81) (I87 2)   11  Vitamin D deficiency (268 9) (E55 9)    Past Medical History    1  History of Depression (311) (F32 9)   2  History of Depression (311) (F32 9)   3  History of Dysphagia (787 20) (R13 10)   4  History of Multiple Pulmonary Emboli (V12 51)   5  History of Need for prophylactic vaccination and inoculation against influenza (V04 81)   (Z23)   6  History of Stroke Syndrome (436)    Family History  Mother    1  Family history of Arthritis (V17 7)   2  Family history of Cancer   3  Family history of Gout (V18 19)  Father    4  Family history of Cancer   5  Family history of Father  At Age ___  Daughter    10  Family history of Diabetes Mellitus (V18 0)    Social History    · Never A Smoker    Current Meds   1  Calcitriol 0 25 MCG Oral Capsule; TAKE 1 CAPSULE ORALLY DAILY MONDAY,   WEDNESDAY & FRIDAY (CHRONIC KIDNEY DISEASE); Therapy: 21DYY6872 to (Linus Moon)  Requested for: 45ITC7521; Last   Rx:[id number] Ordered   2  Furosemide 20 MG Oral Tablet; Take 1 tablet daily; Therapy: 77KKF5161 to (Last Rx:2016)  Requested for: 10Yum3218 Ordered   3  Levothyroxine Sodium 125 MCG Oral Tablet; TAKE 1 TABLET DAILY; Therapy: 32OPE0459 to (Evaluate:2017)  Requested for: 56FVJ7797; Last   Rx:[id number] Ordered   4  Losartan Potassium-HCTZ 50-12 5 MG Oral Tablet; TAKE 1 TABLET DAILY; Therapy: 83XWI6881 to (Evaluate:2017)  Requested for: 43BUQ4863; Last   Rx:56Vpe5117 Ordered   5  Simvastatin 20 MG Oral Tablet; take 1 tablet by mouth every day;    Therapy: 2013 to (Evaluate:30Jan2017)  Requested for: 69SXG4106; Last   Rx:[id number] Ordered   6  Uloric 40 MG Oral Tablet; TAKE 1 TABLET ORALLY DAILY (GOUT); Therapy: 17Skc1193 to (Evaluate:43Kba5194)  Requested for: 94Cdm2116; Last   Rx:[id number] Ordered    Allergies    1  Allopurinol TABS   2   Aspirin CHEW    Signatures   Electronically signed by : WALESKA Jones ; Jan 5 2018  9:36PM EST                       (Author)

## 2018-02-02 NOTE — PROCEDURES
Procedures by Henrietta Herndon RN at 12/1/2017  2:24 PM      Author:  Henrietta Herndon RN Service:  (none) Author Type:  Registered Nurse    Filed:  12/1/2017  2:27 PM Date of Service:  12/1/2017  2:24 PM Status:  Signed    :  Henrietta Herndon RN (Registered Nurse)        Procedure Orders:       1  Insert PICC line [[id number]] ordered by Merline Richters, MD at 12/01/17 1213                  Insert PICC  line  Date/Time: 12/1/2017 1:50 PM  Performed by: Miranda Rios by: Jennifer Luna     Patient location:  Bedside  Other Assisting Provider:  No    Consent:     Consent obtained:  Verbal (Lor Sood obtained consent )    Consent given by: given by pts daughter   Universal protocol:     Procedure explained and questions answered to patient or proxy's satisfaction: yes      Relevant documents present and verified: yes      Test results available and properly labeled: yes       Imaging studies available: yes      Required blood products, implants, devices, and special equipment available: yes      Site/side marked: yes      Immediately prior to procedure, a time out was called: yes      Patient identity confirmed:  Hospital-assigned identification number and arm band  Pre-procedure details:     Hand hygiene: Hand hygiene performed prior to insertion      Sterile barrier technique: All elements of maximal sterile technique followed      Skin preparation:  ChloraPrep    Skin preparation agent: Skin preparation agent completely dried prior to procedure    Indications:     PICC line indications: long term antibiotics    Anesthesia (see MAR for exact dosages):      Anesthesia method:  Local infiltration    Local anesthetic:  Lidocaine 1% w/o epi (2 ml)  Procedure details:     Location:  Basilic    Vessel type: vein      Laterality:  Right    Approach: percutaneous technique used      Patient position:  Flat    Procedural supplies:  Triple lumen (no 5 Fr piccs in house Dr Tina Sen ordered triple ) Catheter size:  5 Fr    Landmarks identified: yes      Ultrasound guidance: yes      Sterile ultrasound techniques: Sterile gel and sterile probe covers were used      Number of attempts:  1    Successful placement: yes      Vessel of catheter tip end:  Chest Xray needed to confirm placement    Total catheter length (cm):  42    Catheter out on skin (cm):  0    Max flow rate:  999ml/hr    Arm circumference:  36  Post-procedure details:     Post-procedure:  Securement device placed    Assessment:  Blood return through all ports    Post-procedure complications: none      Patient tolerance of procedure:   Tolerated well, no immediate complications  Comments:      LOT # [id number]                     Received for:Provider  EPIC   Dec  1 2017  2:28PM [facility] Standard Time 02-Feb-2018 17:16

## 2019-01-01 NOTE — SUBJECTIVE & OBJECTIVE
VTE Pharmacologic Prophylaxis:   Pharmacologic: Heparin  Mechanical VTE Prophylaxis in Place: Yes    Patient Centered Rounds: I have performed bedside rounds with nursing staff today  Discussions with Specialists or Other Care Team Provider:     Education and Discussions with Family / Patient: spoke w/daughter at length about disposition, and bacteremia    Time Spent for Care: 20 minutes  More than 50% of total time spent on counseling and coordination of care as described above  Current Length of Stay: 4 day(s)    Current Patient Status: Inpatient   Certification Statement: The patient will continue to require additional inpatient hospital stay due to bacteremia    Discharge Plan / Estimated Discharge Date:     Code Status: Level 3 - DNAR and DNI      Subjective:   No events overnight per nursing, no diarrhea, no fevers  Patient responds only nonsensical responses, likely due to underlying dementia  He does shake his head no when asked if he has any pain, however  When asked if he could be examined he said what better get over there is and pointed towards the door  He is otherwise conversational and pleasant  Objective:     Vitals:   Temp (24hrs), Av 5 °F (36 4 °C), Min:97 5 °F (36 4 °C), Max:97 6 °F (36 4 °C)    HR:  [] 70  Resp:  [18] 18  BP: (135-147)/(61-85) 141/61  SpO2:  [95 %-97 %] 96 %  Body mass index is 32 42 kg/m²  Input and Output Summary (last 24 hours):     No intake or output data in the 24 hours ending 17 1550    Physical Exam:     Physical Exam   Constitutional: He appears well-developed  No distress  HENT:   Head: Normocephalic and atraumatic  Right Ear: External ear normal    Left Ear: External ear normal    Eyes: Conjunctivae are normal    Neck: Normal range of motion  Cardiovascular: Normal rate, regular rhythm and intact distal pulses  Exam reveals friction rub  Exam reveals no gallop  No murmur heard    Pulmonary/Chest: No respiratory distress  He has no wheezes  He has no rales  Abdominal: Soft  He exhibits no distension  There is no tenderness  There is no rebound and no guarding  Musculoskeletal: He exhibits no edema  Neurological: He is alert  Opens eyes to voice, follow commands with breathing, can squeeze hands  Skin: Skin is warm and dry  He is not diaphoretic  Psychiatric:   Nonsensical verbal responses to questioning regarding nausea/vomiting  Pt does speak in full sentences   Vitals reviewed  Additional Data:     Labs:      Results from last 7 days  Lab Units 11/28/17  0631   WBC Thousand/uL 5 88   HEMOGLOBIN g/dL 8 6*   HEMATOCRIT % 25 4*   PLATELETS Thousands/uL 188   NEUTROS PCT % 57   LYMPHS PCT % 25   MONOS PCT % 14*   EOS PCT % 4       Results from last 7 days  Lab Units 11/28/17  0631 11/27/17  1654   SODIUM mmol/L 143 143   POTASSIUM mmol/L 3 4* 3 7   CHLORIDE mmol/L 111* 108   CO2 mmol/L 23 23   BUN mg/dL 29* 28*   CREATININE mg/dL 1 54* 1 66*   CALCIUM mg/dL 7 6* 8 2*   TOTAL PROTEIN g/dL  --  6 3*   BILIRUBIN TOTAL mg/dL  --  0 40   ALK PHOS U/L  --  57   ALT U/L  --  27   AST U/L  --  26   GLUCOSE RANDOM mg/dL 95 112       Results from last 7 days  Lab Units 11/24/17  1202   INR  1 09       * I Have Reviewed All Lab Data Listed Above  * Additional Pertinent Lab Tests Reviewed: All Labs Within Last 24 Hours Reviewed    Imaging:    Imaging Reports Reviewed Today Include:   Imaging Personally Reviewed by Myself Includes:      Recent Cultures (last 7 days):       Results from last 7 days  Lab Units 11/25/17  1825 11/24/17  1203 11/24/17  1202   BLOOD CULTURE  No Growth at 48 hrs  No Growth at 48 hrs     Staphylococcus aureus*   Staphylococcus aureus*   GRAM STAIN RESULT   --  Gram positive cocci in clusters Gram positive cocci in clusters       Last 24 Hours Medication List:     amLODIPine 10 mg Oral Daily   calcitriol 0 25 mcg Oral Every Other Day   cefazolin 2,000 mg Intravenous Q8H   memantine 10 mg Oral BID   And      donepezil 5 mg Oral BID   heparin (porcine) 5,000 Units Subcutaneous Q8H [facility]   levothyroxine 125 mcg Oral Daily   nystatin  Topical BID   pravastatin 40 mg Oral Daily With Dinner        Today, Patient Was Seen By: Juan Pablo Alaniz PA-C    ** Please Note: This note has been constructed using a voice recognition system   ** Recommend adding Poly-Vi-Sol (1ml/d) at full feeds